# Patient Record
Sex: FEMALE | Race: WHITE
[De-identification: names, ages, dates, MRNs, and addresses within clinical notes are randomized per-mention and may not be internally consistent; named-entity substitution may affect disease eponyms.]

---

## 2018-01-28 ENCOUNTER — HOSPITAL ENCOUNTER (INPATIENT)
Dept: HOSPITAL 17 - PHED | Age: 63
LOS: 8 days | Discharge: HOME HEALTH | DRG: 193 | End: 2018-02-05
Attending: HOSPITALIST | Admitting: HOSPITALIST
Payer: MEDICAID

## 2018-01-28 VITALS
RESPIRATION RATE: 20 BRPM | OXYGEN SATURATION: 100 % | SYSTOLIC BLOOD PRESSURE: 128 MMHG | DIASTOLIC BLOOD PRESSURE: 74 MMHG | TEMPERATURE: 98.4 F | HEART RATE: 85 BPM

## 2018-01-28 VITALS — OXYGEN SATURATION: 95 %

## 2018-01-28 VITALS
OXYGEN SATURATION: 99 % | SYSTOLIC BLOOD PRESSURE: 125 MMHG | DIASTOLIC BLOOD PRESSURE: 53 MMHG | HEART RATE: 87 BPM | RESPIRATION RATE: 20 BRPM

## 2018-01-28 VITALS — WEIGHT: 258.82 LBS | BODY MASS INDEX: 43.12 KG/M2 | HEIGHT: 65 IN

## 2018-01-28 VITALS — OXYGEN SATURATION: 100 %

## 2018-01-28 VITALS
DIASTOLIC BLOOD PRESSURE: 74 MMHG | RESPIRATION RATE: 20 BRPM | HEART RATE: 85 BPM | OXYGEN SATURATION: 100 % | SYSTOLIC BLOOD PRESSURE: 128 MMHG

## 2018-01-28 VITALS
SYSTOLIC BLOOD PRESSURE: 167 MMHG | OXYGEN SATURATION: 95 % | TEMPERATURE: 98.4 F | DIASTOLIC BLOOD PRESSURE: 57 MMHG | RESPIRATION RATE: 28 BRPM | HEART RATE: 109 BPM

## 2018-01-28 VITALS
OXYGEN SATURATION: 97 % | RESPIRATION RATE: 22 BRPM | TEMPERATURE: 98 F | SYSTOLIC BLOOD PRESSURE: 153 MMHG | DIASTOLIC BLOOD PRESSURE: 70 MMHG | HEART RATE: 89 BPM

## 2018-01-28 VITALS
OXYGEN SATURATION: 100 % | RESPIRATION RATE: 28 BRPM | SYSTOLIC BLOOD PRESSURE: 119 MMHG | DIASTOLIC BLOOD PRESSURE: 50 MMHG | HEART RATE: 82 BPM

## 2018-01-28 VITALS — DIASTOLIC BLOOD PRESSURE: 64 MMHG | SYSTOLIC BLOOD PRESSURE: 131 MMHG

## 2018-01-28 VITALS — OXYGEN SATURATION: 96 %

## 2018-01-28 DIAGNOSIS — J11.00: Primary | ICD-10-CM

## 2018-01-28 DIAGNOSIS — E66.01: ICD-10-CM

## 2018-01-28 DIAGNOSIS — J96.01: ICD-10-CM

## 2018-01-28 DIAGNOSIS — J44.0: ICD-10-CM

## 2018-01-28 DIAGNOSIS — I89.0: ICD-10-CM

## 2018-01-28 DIAGNOSIS — I10: ICD-10-CM

## 2018-01-28 DIAGNOSIS — F41.0: ICD-10-CM

## 2018-01-28 DIAGNOSIS — J45.52: ICD-10-CM

## 2018-01-28 DIAGNOSIS — Z87.891: ICD-10-CM

## 2018-01-28 DIAGNOSIS — J96.02: ICD-10-CM

## 2018-01-28 DIAGNOSIS — I87.8: ICD-10-CM

## 2018-01-28 LAB
BASOPHILS # BLD AUTO: 0 TH/MM3 (ref 0–0.2)
BASOPHILS NFR BLD: 0.3 % (ref 0–2)
BUN SERPL-MCNC: 11 MG/DL (ref 7–18)
CALCIUM SERPL-MCNC: 8.3 MG/DL (ref 8.5–10.1)
CHLORIDE SERPL-SCNC: 101 MEQ/L (ref 98–107)
CREAT SERPL-MCNC: 0.77 MG/DL (ref 0.5–1)
EOSINOPHIL # BLD: 0 TH/MM3 (ref 0–0.4)
EOSINOPHIL NFR BLD: 0.4 % (ref 0–4)
ERYTHROCYTE [DISTWIDTH] IN BLOOD BY AUTOMATED COUNT: 13.4 % (ref 11.6–17.2)
GFR SERPLBLD BASED ON 1.73 SQ M-ARVRAT: 75 ML/MIN (ref 89–?)
GLUCOSE SERPL-MCNC: 138 MG/DL (ref 74–106)
HCO3 BLD-SCNC: 27.9 MEQ/L (ref 21–32)
HCT VFR BLD CALC: 38.9 % (ref 35–46)
HGB BLD-MCNC: 12.5 GM/DL (ref 11.6–15.3)
INR PPP: 1 RATIO
LYMPHOCYTES # BLD AUTO: 1.3 TH/MM3 (ref 1–4.8)
LYMPHOCYTES NFR BLD AUTO: 13.8 % (ref 9–44)
MAGNESIUM SERPL-MCNC: 2.1 MG/DL (ref 1.5–2.5)
MCH RBC QN AUTO: 28.6 PG (ref 27–34)
MCHC RBC AUTO-ENTMCNC: 32 % (ref 32–36)
MCV RBC AUTO: 89.1 FL (ref 80–100)
MONOCYTE #: 1.1 TH/MM3 (ref 0–0.9)
MONOCYTES NFR BLD: 11 % (ref 0–8)
NEUTROPHILS # BLD AUTO: 7.3 TH/MM3 (ref 1.8–7.7)
NEUTROPHILS NFR BLD AUTO: 74.5 % (ref 16–70)
PLATELET # BLD: 225 TH/MM3 (ref 150–450)
PMV BLD AUTO: 9 FL (ref 7–11)
PROTHROMBIN TIME: 10.5 SEC (ref 9.8–11.6)
RBC # BLD AUTO: 4.36 MIL/MM3 (ref 4–5.3)
SODIUM SERPL-SCNC: 137 MEQ/L (ref 136–145)
TROPONIN I SERPL-MCNC: (no result) NG/ML (ref 0.02–0.05)
WBC # BLD AUTO: 9.7 TH/MM3 (ref 4–11)

## 2018-01-28 PROCEDURE — 85025 COMPLETE CBC W/AUTO DIFF WBC: CPT

## 2018-01-28 PROCEDURE — 82948 REAGENT STRIP/BLOOD GLUCOSE: CPT

## 2018-01-28 PROCEDURE — 84100 ASSAY OF PHOSPHORUS: CPT

## 2018-01-28 PROCEDURE — 94668 MNPJ CHEST WALL SBSQ: CPT

## 2018-01-28 PROCEDURE — 83735 ASSAY OF MAGNESIUM: CPT

## 2018-01-28 PROCEDURE — 85610 PROTHROMBIN TIME: CPT

## 2018-01-28 PROCEDURE — 80053 COMPREHEN METABOLIC PANEL: CPT

## 2018-01-28 PROCEDURE — 80048 BASIC METABOLIC PNL TOTAL CA: CPT

## 2018-01-28 PROCEDURE — 71045 X-RAY EXAM CHEST 1 VIEW: CPT

## 2018-01-28 PROCEDURE — 83880 ASSAY OF NATRIURETIC PEPTIDE: CPT

## 2018-01-28 PROCEDURE — 94664 DEMO&/EVAL PT USE INHALER: CPT

## 2018-01-28 PROCEDURE — 87040 BLOOD CULTURE FOR BACTERIA: CPT

## 2018-01-28 PROCEDURE — 93005 ELECTROCARDIOGRAM TRACING: CPT

## 2018-01-28 PROCEDURE — 87804 INFLUENZA ASSAY W/OPTIC: CPT

## 2018-01-28 PROCEDURE — 94002 VENT MGMT INPAT INIT DAY: CPT

## 2018-01-28 PROCEDURE — 87449 NOS EACH ORGANISM AG IA: CPT

## 2018-01-28 PROCEDURE — 84484 ASSAY OF TROPONIN QUANT: CPT

## 2018-01-28 PROCEDURE — 82805 BLOOD GASES W/O2 SATURATION: CPT

## 2018-01-28 PROCEDURE — 94667 MNPJ CHEST WALL 1ST: CPT

## 2018-01-28 PROCEDURE — 5A09457 ASSISTANCE WITH RESPIRATORY VENTILATION, 24-96 CONSECUTIVE HOURS, CONTINUOUS POSITIVE AIRWAY PRESSURE: ICD-10-PCS | Performed by: EMERGENCY MEDICINE

## 2018-01-28 PROCEDURE — 99291 CRITICAL CARE FIRST HOUR: CPT

## 2018-01-28 PROCEDURE — 85027 COMPLETE CBC AUTOMATED: CPT

## 2018-01-28 PROCEDURE — 94003 VENT MGMT INPAT SUBQ DAY: CPT

## 2018-01-28 PROCEDURE — 94640 AIRWAY INHALATION TREATMENT: CPT

## 2018-01-28 PROCEDURE — 71275 CT ANGIOGRAPHY CHEST: CPT

## 2018-01-28 PROCEDURE — 93970 EXTREMITY STUDY: CPT

## 2018-01-28 PROCEDURE — 36600 WITHDRAWAL OF ARTERIAL BLOOD: CPT

## 2018-01-28 RX ADMIN — IPRATROPIUM BROMIDE AND ALBUTEROL SULFATE SCH AMPULE: .5; 3 SOLUTION RESPIRATORY (INHALATION) at 17:48

## 2018-01-28 RX ADMIN — IPRATROPIUM BROMIDE AND ALBUTEROL SULFATE SCH AMPULE: .5; 3 SOLUTION RESPIRATORY (INHALATION) at 17:49

## 2018-01-28 RX ADMIN — ALBUTEROL SULFATE SCH MG: 2.5 SOLUTION RESPIRATORY (INHALATION) at 19:43

## 2018-01-28 NOTE — PD
HPI


Chief Complaint:  Respiratory Symptoms


Time Seen by Provider:  17:36


Travel History


International Travel<30 days:  No


Contact w/Intl Traveler<30days:  No


Traveled to known affect area:  No





History of Present Illness


HPI


63-year-old female came to the emergency room with history of shortness of 

breath that has been extremely severe today.  Patient says that she has been 

trying to take care of for cough that she's had for past 3-4 days.  There are 

close family members were diagnosed with influenza.  Patient also has history 

of asthma and today she started getting extremely short of breath.  She called 

EMS and when they arrived they noticed that she was short of breath but got 

worse when she tried to take few steps.  They gave her 2 breathing treatments 

and IV Solu-Medrol and brought her in.  Her oxygen saturation upon their 

arrival was 89% on room air.  Patient normally does not require oxygen at home.

  She is not a smoker she said.  When patient tried to get from the ambulance 

stretcher to the ER stretcher she got extremely short of breath with air hunger.





PFSH


Past Medical History


*** Narrative Medical


List of her past medical, surgical, social and family history is reviewed from 

the nursing note.


Asthma:  Yes


Anxiety:  Yes


COPD:  Yes


Diminished Hearing:  No


Psychiatric:  Yes (PANIC DISORDER)


Seizures:  Yes


Menopausal:  Yes





Past Surgical History


Body Medical Devices:  ANXIETY


Gynecologic Surgery:  Yes (HX OF D&C)





Social History


Alcohol Use:  No


Tobacco Use:  Yes (OCCAISIONAL)


Substance Use:  No





Allergies-Medications


(Allergen,Severity, Reaction):  


Coded Allergies:  


     codeine (Unverified  Allergy, Severe, RASH, 1/28/18)


     haloperidol (Unverified  Adverse Reaction, Severe, "DYSTONIC REACTION", 1/ 28/18)


Comments


List of her allergies reviewed from the nursing note.


Reported Meds & Prescriptions





Reported Meds & Active Scripts


Active


Reported


Albuterol Neb (Albuterol Sulfate) 2.5 Mg/0.5 Ml Neb 2.5 Mg NEB ONCE


     Note: The Albuterol Sulfate Inhalation Solution is concentrated and


     must be diluted. Read complete instructions carefully before using.


Norvasc (Amlodipine Besylate) 2.5 Mg Tab Unknown Dose PO DAILY


Lopressor (Metoprolol Tartrate) 50 Mg Tab Unknown Dose PO BID





Narrative Medication


List of her home medications reviewed from the nursing note.





Review of Systems


Except as stated in HPI:  all other systems reviewed are Neg


Respiratory:  Positive: Cough, Shortness of Breath, Wheezing





Physical Exam


Narrative


GENERAL: Awake, alert, severe distress, morbidly obese, significantly anxious


SKIN: Focused skin assessment warm/dry.


HEAD: Atraumatic. Normocephalic. 


EYES: Pupils equal and round. No scleral icterus. No injection or drainage. 


ENT: No nasal bleeding or discharge.  Mucous membranes pink and moist.


NECK: Trachea midline. No JVD. 


CARDIOVASCULAR: Regular rate and rhythm.  No murmur appreciated.


RESPIRATORY: Severe respiratory distress almost air hunger.  Audible wheezes 

even without stethoscope


GASTROINTESTINAL: Abdomen soft, non-tender, nondistended. Hepatic and splenic 

margins not palpable. 


MUSCULOSKELETAL: No obvious deformities. No clubbing.  No cyanosis.  No edema. 


NEUROLOGICAL: Awake and alert. No obvious cranial nerve deficits.  Motor 

grossly within normal limits. Normal speech.


PSYCHIATRIC: Appropriate mood and affect; insight and judgment normal.





Data


Data


Last Documented VS





Vital Signs








  Date Time  Temp Pulse Resp B/P (MAP) Pulse Ox O2 Delivery O2 Flow Rate FiO2


 


1/28/18 18:59  82 28 119/50 (73) 100 BiPAP  


 


1/28/18 18:43 98.4       


 


1/28/18 18:10        50








Orders





 Orders


Complete Blood Count With Diff (1/28/18 17:36)


Basic Metabolic Panel (Bmp) (1/28/18 17:36)


B-Type Natriuretic Peptide (1/28/18 17:36)


Prothrombin Time / Inr (Pt) (1/28/18 17:36)


Troponin I (1/28/18 17:36)


Arterial Blood Gas (Abg) (1/28/18 17:36)


Influenzae A/B Antigen (1/28/18 17:36)


Blood Culture (1/28/18 17:36)


Iv Access Insert/Monitor (1/28/18 17:36)


Electrocardiogram (1/28/18 17:36)


Ecg Monitoring (1/28/18 17:36)


Oximetry (1/28/18 17:36)


Oxygen Administration (1/28/18 17:36)


Chest, Single Ap (1/28/18 17:36)


Sodium Chloride 0.9% Flush (Ns Flush) (1/28/18 17:45)


Albuterol-Ipratropium Neb (Duoneb Neb) (1/28/18 17:45)


Resp Bipap / Cpap Non Invas Vt (1/28/18 17:36)


Albuterol-Ipratropium Neb (Duoneb Neb) (1/28/18 17:39)


Albuterol Neb (Albuterol Neb) (1/28/18 19:45)


Admit Order (Ed Use Only) (1/28/18 19:50)


Magnesium (Mg) (1/28/18 17:55)


Ct Pulmonary Angiogram (1/29/18 )





Labs





Laboratory Tests








Test


  1/28/18


17:55 1/28/18


18:00


 


White Blood Count 9.7 TH/MM3  


 


Red Blood Count 4.36 MIL/MM3  


 


Hemoglobin 12.5 GM/DL  


 


Hematocrit 38.9 %  


 


Mean Corpuscular Volume 89.1 FL  


 


Mean Corpuscular Hemoglobin 28.6 PG  


 


Mean Corpuscular Hemoglobin


Concent 32.0 % 


  


 


 


Red Cell Distribution Width 13.4 %  


 


Platelet Count 225 TH/MM3  


 


Mean Platelet Volume 9.0 FL  


 


Neutrophils (%) (Auto) 74.5 %  


 


Lymphocytes (%) (Auto) 13.8 %  


 


Monocytes (%) (Auto) 11.0 %  


 


Eosinophils (%) (Auto) 0.4 %  


 


Basophils (%) (Auto) 0.3 %  


 


Neutrophils # (Auto) 7.3 TH/MM3  


 


Lymphocytes # (Auto) 1.3 TH/MM3  


 


Monocytes # (Auto) 1.1 TH/MM3  


 


Eosinophils # (Auto) 0.0 TH/MM3  


 


Basophils # (Auto) 0.0 TH/MM3  


 


CBC Comment DIFF FINAL  


 


Differential Comment   


 


Prothrombin Time 10.5 SEC  


 


Prothromb Time International


Ratio 1.0 RATIO 


  


 


 


Blood Urea Nitrogen 11 MG/DL  


 


Creatinine 0.77 MG/DL  


 


Random Glucose 138 MG/DL  


 


Calcium Level 8.3 MG/DL  


 


Magnesium Level 2.1 MG/DL  


 


Sodium Level 137 MEQ/L  


 


Potassium Level 3.5 MEQ/L  


 


Chloride Level 101 MEQ/L  


 


Carbon Dioxide Level 27.9 MEQ/L  


 


Anion Gap 8 MEQ/L  


 


Estimat Glomerular Filtration


Rate 75 ML/MIN 


  


 


 


Troponin I


  LESS THAN 0.02


NG/ML 


 


 


B-Type Natriuretic Peptide 54 PG/ML  


 


Blood Gas Puncture Site  RT RADIAL 


 


Blood Gas Patient Temperature  98.6 


 


Blood Gas HCO3  27 mmol/L 


 


Blood Gas Base Excess  1.5 mmol/L 


 


Blood Gas Oxygen Saturation  98 % 


 


Arterial Blood pH  7.34 


 


Arterial Blood Partial


Pressure CO2 


  52 mmHG 


 


 


Arterial Blood Partial


Pressure O2 


  451 mmHG 


 


 


Arterial Blood Oxygen Content  17.4 Vol % 


 


Arterial Blood


Carboxyhemoglobin 


  1.0 % 


 


 


Arterial Blood Methemoglobin  1.1 % 


 


Blood Gas Hemoglobin  11.8 G/DL 


 


Oxygen Delivery Device  BIPAP 


 


Blood Gas Ventilator Setting


  


  IPAP 14/ EPAP


7


 


Blood Gas Inspired Oxygen  100 % 











MDM


Medical Decision Making


Medical Screen Exam Complete:  Yes


Emergency Medical Condition:  Yes


Medical Record Reviewed:  Yes


Interpretation(s)


Twelve-lead EKG was reviewed by me.  Normal sinus rhythm, normal axis, 

nonspecific ST-T wave changes.  Heart rate of 82 bpm


Differential Diagnosis


Status asthmaticus, CHF, pneumonia, possible PE


Narrative Course


7:44 PM patient was immediately put on BiPAP since she appeared to be in 

significant distress being on the nebulizer.  She was given 3 duo nebs through 

the BiPAP.  This seemed to help her significantly.  A blood gas was obtained 

after patient was on BiPAP which showed good oxygenation and pH.  Chest x-ray 

showed a possible lung mass in the left apex.  A CT was recommended by the 

radiologist.  At this point I decided to do a CT pulmonary angiogram since 

patient had good renal function and this was allowed us to rule out PE as well 

as the definition on the lung mass.  However I was told that when patient was 

tried to be moved from the EMS stretcher to the CT table she again became 

extremely short of breath.  There was no BiPAP on her at this point because of 

the CT and the tests could not be done and patient was brought back.  She has 

been put on BiPAP again.  I've ordered 2 more doses of albuterol nebulizer.  At 

this point I'm concerned that patient has a potential for respiratory failure 

given her extremely brittle respiratory state.  Also her body habitus is less 

than ideal for crash intubation and hence she should be a better candidate for 

the ICU at the [facility].  I discussed the case with Dr. Grover from ICU who 

agreed.  She has accepted the patient.  Patient will be sent emergently over to 

the [facility] hospital unit.  Her influenza was negative.


Critical Care Narrative


Aggregate critical care time was 60 minutes. Time to perform other separately 

billable procedures was not  


included in the critical care time. My time did not include minutes spent 

treating any other patients simultaneously or on  


activities that did not directly contribute to the patient's treatment.  





The services I provided to this patient were to treat and/or prevent clinically 

significant deterioration that could result  


in: Severe respiratory distress, status asthmaticus, BiPAP





I provided critical care services requiring my management, as noted below:


Chart data review, documentation time, medication orders and management, vital 

sign assessments/reviewing monitor data,  


ordering and reviewing lab tests, ordering and interpreting/reviewing x-rays 

and diagnostic studies, care of the patient  


and discussion of the patient with the admitting physicians.





Procedures


EKG Prior to Arrival:  No





Physician Communication


Physician Communication


Dr. Grover





Diagnosis





 Primary Impression:  


 Respiratory distress


 Additional Impressions:  


 Status asthmaticus


 Qualified Codes:  J45.52 - Severe persistent asthma with status asthmaticus


 Lung mass


 Morbidly obese





Admitting Information


Admitting Physician Requests:  Admit











Michele Bobby MD Jan 28, 2018 17:39

## 2018-01-28 NOTE — RADRPT
EXAM DATE/TIME:  01/28/2018 17:50 

 

[facility] COMPARISON:     

No previous studies available for comparison.

 

                     

INDICATIONS :     

Short of breath

                     

 

MEDICAL HISTORY :     

None.          

 

SURGICAL HISTORY :     

None.   

 

ENCOUNTER:     

Initial                                        

 

ACUITY:     

3 days      

 

PAIN SCORE:     

0/10

 

LOCATION:     

Bilateral chest 

 

FINDINGS:     

Focal infiltrate versus mass left suprahilar region. There is trace atelectasis at both bases. No ple
ural effusion. No pneumothorax.

 

Heart size within normal limits.

 

CONCLUSION:     

1. Potentially a left suprahilar mass. Noncontrast chest CT recommended.

2. Minimal bibasilar atelectasis.

 

 

 

 Devan Gee MD on January 28, 2018 at 17:58           

Board Certified Radiologist.

 This report was verified electronically.

## 2018-01-29 VITALS
DIASTOLIC BLOOD PRESSURE: 65 MMHG | HEART RATE: 81 BPM | RESPIRATION RATE: 22 BRPM | SYSTOLIC BLOOD PRESSURE: 165 MMHG | OXYGEN SATURATION: 99 % | TEMPERATURE: 98 F

## 2018-01-29 VITALS — OXYGEN SATURATION: 99 %

## 2018-01-29 VITALS
SYSTOLIC BLOOD PRESSURE: 166 MMHG | OXYGEN SATURATION: 97 % | RESPIRATION RATE: 22 BRPM | DIASTOLIC BLOOD PRESSURE: 73 MMHG | HEART RATE: 79 BPM

## 2018-01-29 VITALS
RESPIRATION RATE: 24 BRPM | SYSTOLIC BLOOD PRESSURE: 175 MMHG | HEART RATE: 87 BPM | DIASTOLIC BLOOD PRESSURE: 77 MMHG | OXYGEN SATURATION: 97 %

## 2018-01-29 VITALS
HEART RATE: 83 BPM | DIASTOLIC BLOOD PRESSURE: 76 MMHG | RESPIRATION RATE: 39 BRPM | SYSTOLIC BLOOD PRESSURE: 170 MMHG | OXYGEN SATURATION: 97 %

## 2018-01-29 VITALS
OXYGEN SATURATION: 97 % | SYSTOLIC BLOOD PRESSURE: 158 MMHG | RESPIRATION RATE: 23 BRPM | HEART RATE: 88 BPM | TEMPERATURE: 98.8 F | DIASTOLIC BLOOD PRESSURE: 67 MMHG

## 2018-01-29 VITALS — OXYGEN SATURATION: 96 %

## 2018-01-29 VITALS
DIASTOLIC BLOOD PRESSURE: 72 MMHG | SYSTOLIC BLOOD PRESSURE: 169 MMHG | RESPIRATION RATE: 32 BRPM | OXYGEN SATURATION: 98 % | HEART RATE: 80 BPM

## 2018-01-29 VITALS
OXYGEN SATURATION: 98 % | SYSTOLIC BLOOD PRESSURE: 167 MMHG | TEMPERATURE: 97.2 F | DIASTOLIC BLOOD PRESSURE: 72 MMHG | HEART RATE: 87 BPM | RESPIRATION RATE: 32 BRPM

## 2018-01-29 VITALS
OXYGEN SATURATION: 96 % | SYSTOLIC BLOOD PRESSURE: 143 MMHG | HEART RATE: 71 BPM | RESPIRATION RATE: 23 BRPM | DIASTOLIC BLOOD PRESSURE: 65 MMHG

## 2018-01-29 VITALS
RESPIRATION RATE: 33 BRPM | SYSTOLIC BLOOD PRESSURE: 182 MMHG | TEMPERATURE: 98 F | HEART RATE: 80 BPM | OXYGEN SATURATION: 98 % | DIASTOLIC BLOOD PRESSURE: 81 MMHG

## 2018-01-29 VITALS
RESPIRATION RATE: 43 BRPM | HEART RATE: 99 BPM | SYSTOLIC BLOOD PRESSURE: 141 MMHG | OXYGEN SATURATION: 96 % | DIASTOLIC BLOOD PRESSURE: 63 MMHG | TEMPERATURE: 97.9 F

## 2018-01-29 VITALS — HEART RATE: 91 BPM

## 2018-01-29 VITALS
DIASTOLIC BLOOD PRESSURE: 68 MMHG | SYSTOLIC BLOOD PRESSURE: 151 MMHG | HEART RATE: 86 BPM | OXYGEN SATURATION: 98 % | RESPIRATION RATE: 37 BRPM

## 2018-01-29 VITALS
DIASTOLIC BLOOD PRESSURE: 62 MMHG | HEART RATE: 79 BPM | TEMPERATURE: 97.8 F | OXYGEN SATURATION: 100 % | RESPIRATION RATE: 17 BRPM | SYSTOLIC BLOOD PRESSURE: 144 MMHG

## 2018-01-29 VITALS — HEART RATE: 84 BPM

## 2018-01-29 VITALS — OXYGEN SATURATION: 100 %

## 2018-01-29 VITALS — OXYGEN SATURATION: 93 %

## 2018-01-29 LAB
PHOSPHATE SERPL-MCNC: 3.2 MG/DL (ref 2.5–4.9)
TROPONIN I SERPL-MCNC: (no result) NG/ML (ref 0.02–0.05)

## 2018-01-29 RX ADMIN — FAMOTIDINE SCH MG: 20 TABLET, FILM COATED ORAL at 21:01

## 2018-01-29 RX ADMIN — Medication SCH ML: at 21:01

## 2018-01-29 RX ADMIN — ENOXAPARIN SODIUM SCH MG: 40 INJECTION SUBCUTANEOUS at 02:35

## 2018-01-29 RX ADMIN — IPRATROPIUM BROMIDE AND ALBUTEROL SULFATE SCH AMPULE: .5; 3 SOLUTION RESPIRATORY (INHALATION) at 16:30

## 2018-01-29 RX ADMIN — IPRATROPIUM BROMIDE AND ALBUTEROL SULFATE SCH AMPULE: .5; 3 SOLUTION RESPIRATORY (INHALATION) at 11:18

## 2018-01-29 RX ADMIN — Medication SCH ML: at 07:16

## 2018-01-29 RX ADMIN — OSELTAMIVIR PHOSPHATE SCH MG: 75 CAPSULE ORAL at 11:45

## 2018-01-29 RX ADMIN — LABETALOL HYDROCHLORIDE PRN MG: 5 INJECTION, SOLUTION INTRAVENOUS at 07:15

## 2018-01-29 RX ADMIN — ACETAMINOPHEN PRN MG: 325 TABLET ORAL at 08:16

## 2018-01-29 RX ADMIN — METOPROLOL TARTRATE SCH MG: 50 TABLET, FILM COATED ORAL at 08:16

## 2018-01-29 RX ADMIN — OSELTAMIVIR PHOSPHATE SCH MG: 75 CAPSULE ORAL at 21:01

## 2018-01-29 RX ADMIN — IPRATROPIUM BROMIDE AND ALBUTEROL SULFATE SCH AMPULE: .5; 3 SOLUTION RESPIRATORY (INHALATION) at 20:40

## 2018-01-29 RX ADMIN — FAMOTIDINE SCH MG: 20 TABLET, FILM COATED ORAL at 08:17

## 2018-01-29 RX ADMIN — METOPROLOL TARTRATE SCH MG: 50 TABLET, FILM COATED ORAL at 21:01

## 2018-01-29 RX ADMIN — INSULIN ASPART SCH: 100 INJECTION, SOLUTION INTRAVENOUS; SUBCUTANEOUS at 16:25

## 2018-01-29 RX ADMIN — CHLORHEXIDINE GLUCONATE SCH PACK: 500 CLOTH TOPICAL at 04:00

## 2018-01-29 RX ADMIN — INSULIN ASPART SCH: 100 INJECTION, SOLUTION INTRAVENOUS; SUBCUTANEOUS at 11:47

## 2018-01-29 RX ADMIN — INSULIN ASPART SCH: 100 INJECTION, SOLUTION INTRAVENOUS; SUBCUTANEOUS at 08:00

## 2018-01-29 RX ADMIN — LABETALOL HYDROCHLORIDE PRN MG: 5 INJECTION, SOLUTION INTRAVENOUS at 12:05

## 2018-01-29 RX ADMIN — LEVOFLOXACIN SCH MLS/HR: 5 INJECTION, SOLUTION INTRAVENOUS at 16:26

## 2018-01-29 RX ADMIN — IPRATROPIUM BROMIDE AND ALBUTEROL SULFATE SCH AMPULE: .5; 3 SOLUTION RESPIRATORY (INHALATION) at 03:20

## 2018-01-29 RX ADMIN — STANDARDIZED SENNA CONCENTRATE AND DOCUSATE SODIUM SCH TAB: 8.6; 5 TABLET, FILM COATED ORAL at 08:17

## 2018-01-29 RX ADMIN — INSULIN ASPART SCH: 100 INJECTION, SOLUTION INTRAVENOUS; SUBCUTANEOUS at 21:00

## 2018-01-29 RX ADMIN — STANDARDIZED SENNA CONCENTRATE AND DOCUSATE SODIUM SCH TAB: 8.6; 5 TABLET, FILM COATED ORAL at 21:01

## 2018-01-29 RX ADMIN — IPRATROPIUM BROMIDE AND ALBUTEROL SULFATE SCH AMPULE: .5; 3 SOLUTION RESPIRATORY (INHALATION) at 08:06

## 2018-01-29 RX ADMIN — IPRATROPIUM BROMIDE AND ALBUTEROL SULFATE SCH AMPULE: .5; 3 SOLUTION RESPIRATORY (INHALATION) at 23:55

## 2018-01-29 NOTE — HHI.HP
Viral Upper Respiratory Illness (Child)  Your child has a viral upper respiratory illness (URI), which is another term for the common cold. The virus is contagious during the first few days. It is spread through the air by coughing, sneezing, or by direct contact (touching your sick child then touching your own eyes, nose, or mouth). Frequent handwashing will decrease risk of spread. Most viral illnesses resolve within 7 to 14 days with rest and simple home remedies. However, they may sometimes last up to 4 weeks. Antibiotics will not kill a virus and are generally not prescribed for this condition.    Home care  · Fluids: Fever increases water loss from the body. Encourage your child to drink lots of fluids to loosen lung secretions and make it easier to breathe. For infants under 1 year old, continue regular formula or breast feedings. Between feedings, give oral rehydration solution. This is available from drugstores and grocery stores without a prescription. For children over 1 year old, give plenty of fluids, such as water, juice, gelatin water, soda without caffeine, ginger ale, lemonade, or ice pops.  · Eating: If your child doesn't want to eat solid foods, it's OK for a few days, as long as he or she drinks lots of fluid.  · Rest: Keep children with fever at home resting or playing quietly until the fever is gone. Encourage frequent naps. Your child may return to day care or school when the fever is gone and he or she is eating well and feeling better.  · Sleep: Periods of sleeplessness and irritability are common. A congested child will sleep best with the head and upper body propped up on pillows or with the head of the bed frame raised on a 6-inch block. An infant may sleep in a car seat placed in the crib or in a baby swing. If you use a car seat or baby swing, always make certain the baby is safely fastened in the device.  · Cough: Coughing is a normal part of this illness. A cool mist humidifier at  HPI


Service


Critical Care Medicine


Primary Care Physician


No Primary Care Physician


Admission Diagnosis





respiratory distress, status asthmaticus, lung mass, morbid obesity


Diagnosis:  


Travel History


International Travel<30 Days:  No


Contact w/Intl Traveler <30 Da:  No


Traveled to Known Affected Are:  No


History of Present Illness


63-year-old  female with past medical history of asthma, hypertension, 

obesity, lymphedema  who presented to [facility] emergency 

department [place] with a three-day history of shortness of breath. She had 

wheezing and increased work of breathing upon [facility] arrival and was given 

nebulizer treatments and Solu-Medrol en route.  She was started on BiPAP . 

  She was given Duoneb x3 in the ED and appeared improved so Bipap mask was 

removed and she was taken for CTPA but she became more SOB and the imaging 

could not be completed. She was placed back on Bipap, given albuterol nebs x2 

and transferred to intensivist at [facility].  She states symptoms began 3 days ago 

with cough, rhinorrhea, subjective fever, sore throat, fatigue, and myalgias. 

She believed she had the flu and stated she had been exposed to others with 

similar symptoms.  She had progressive wheezing and shortness of breath and 

said that this is very similar to a prior asthma exacerbation that was 

triggered by URI and resulted in hospitalization on BiPAP in .  Says she 

has had asthma since age 2 and has never been intubated in the past. She denies 

chest pain or hemoptysis. She has chronic lower extremity lymphedema and venous 

stasis changes.





Past Family Social History


Allergies:  


Coded Allergies:  


     codeine (Unverified  Allergy, Severe, RASH, 18)


     haloperidol (Unverified  Adverse Reaction, Severe, "DYSTONIC REACTION", )


Past Medical History


Asthma


Hypertension


Anxiety


Lower extremity lymphedema


Past Surgical History


D and C


Reported Medications


Albuterol


Metoprolol 50 mg by mouth twice a day


Norvasc 10 mg daily (per discussion with patient)


Family History


Father had hypertension


Monitor  age 89 with breast cancer and colon cancer


Social History


Quit smoking in the 


No alcohol or illicit drug use





Physical Exam


Vital Signs





Vital Signs








  Date Time  Temp Pulse Resp B/P (MAP) Pulse Ox O2 Delivery O2 Flow Rate FiO2


 


18 01:40     100   50


 


18 00:00 97.8 79 17 144/62 (89) 100   


 


18 22:15 98.0 89 22 153/70 (97) 97   


 


18 22:05     95   40


 


18 21:15  87 20 131/64 (86) 94 Non-Rebreather  


 


18 20:00  87 20 125/53 (77) 99 BiPAP  


 


18 18:59  82 28 119/50 (73) 100 BiPAP  


 


18 18:59  82 28  100 BiPAP  


 


18 18:43 98.4 85 20 128/74 (92) 100 BiPAP  


 


18 18:37  85 20 128/74 (92) 100 Ventilator  


 


18 18:10     100   50


 


18 17:53     100   100


 


18 17:43      BiPAP  


 


18 17:43     96   


 


18 17:37 98.4 109 28 167/57 (93) 95   








Physical Exam


GENERAL: Well-nourished, well-developed obese  female who is sitting 

up in [facility] bed on BiPAP.


SKIN: Warm and dry.


HEAD: Atraumatic. Normocephalic. 


EYES: Pupils equal and round. No scleral icterus. No injection or drainage. 


ENT: BiPAP mask in place.


CV: Regular rate and rhythm rhythm. No murmurs rubs or gallops. 


RESPIRATORY: On BiPAP 14/8 with FiO2 50% and respiratory rate in low 20s. 

Bilateral wheezing. No rales. 


GASTROINTESTINAL: Abdomen soft, non-tender, nondistended. 


MUSCULOSKELETAL: Extremities without clubbing, cyanosis, bilateral edema with 

venous stasis changes overlyingdistal tibia bilaterally


NEUROLOGICAL: Awake and alert, oriented. Motor grossly within normal limits.


Laboratory





Laboratory Tests








Test


  18


17:55 18


18:00 18


01:17


 


White Blood Count 9.7   


 


Red Blood Count 4.36   


 


Hemoglobin 12.5   


 


Hematocrit 38.9   


 


Mean Corpuscular Volume 89.1   


 


Mean Corpuscular Hemoglobin 28.6   


 


Mean Corpuscular Hemoglobin


Concent 32.0 


  


  


 


 


Red Cell Distribution Width 13.4   


 


Platelet Count 225   


 


Mean Platelet Volume 9.0   


 


Neutrophils (%) (Auto) 74.5   


 


Lymphocytes (%) (Auto) 13.8   


 


Monocytes (%) (Auto) 11.0   


 


Eosinophils (%) (Auto) 0.4   


 


Basophils (%) (Auto) 0.3   


 


Neutrophils # (Auto) 7.3   


 


Lymphocytes # (Auto) 1.3   


 


Monocytes # (Auto) 1.1   


 


Eosinophils # (Auto) 0.0   


 


Basophils # (Auto) 0.0   


 


CBC Comment DIFF FINAL   


 


Differential Comment    


 


Prothrombin Time 10.5   


 


Prothromb Time International


Ratio 1.0 


  


  


 


 


Blood Urea Nitrogen 11   


 


Creatinine 0.77   


 


Random Glucose 138   


 


Calcium Level 8.3   


 


Magnesium Level 2.1   


 


Sodium Level 137   


 


Potassium Level 3.5   


 


Chloride Level 101   


 


Carbon Dioxide Level 27.9   


 


Anion Gap 8   


 


Estimat Glomerular Filtration


Rate 75 


  


  


 


 


Troponin I LESS THAN 0.02   LESS THAN 0.02 


 


B-Type Natriuretic Peptide 54   


 


Blood Gas Puncture Site  RT RADIAL  


 


Blood Gas Patient Temperature  98.6  


 


Blood Gas HCO3  27  


 


Blood Gas Base Excess  1.5  


 


Blood Gas Oxygen Saturation  98  


 


Arterial Blood pH  7.34  


 


Arterial Blood Partial


Pressure CO2 


  52 


  


 


 


Arterial Blood Partial


Pressure O2 


  451 


  


 


 


Arterial Blood Oxygen Content  17.4  


 


Arterial Blood


Carboxyhemoglobin 


  1.0 


  


 


 


Arterial Blood Methemoglobin  1.1  


 


Blood Gas Hemoglobin  11.8  


 


Oxygen Delivery Device  BIPAP  


 


Blood Gas Ventilator Setting


  


  IPAP 14/ EPAP


7 


 


 


Blood Gas Inspired Oxygen  100  














 Date/Time


Source Procedure


Growth Status


 


 


 18 17:55


Blood Peripheral Aerobic Blood Culture


Pending Received


 


 18 17:55


Blood Peripheral Anaerobic Blood Culture


Pending Received


 


 18 18:15


Nasal Aspirate Influenza Types A,B Antigen (NICK) - Final


NEGATIVE FOR FLU A AND B ANTIGEN.... Complete








Result Diagram:  


18 175








Caprini VTE Risk Assessment


Caprini Risk Assessment Model











 Point Value = 1          Point Value = 2  Point Value = 3  Point Value = 5


 


Age 41-60


Minor surgery


BMI > 25 kg/m2


Swollen legs


Varicose veins


Pregnancy or postpartum


History of unexplained or recurrent


   spontaneous 


Oral contraceptives or hormone


   replacement


Sepsis (< 1 month)


Serious lung disease, including


   pneumonia (< 1 month)


Abnormal pulmonary function


Acute myocardial infarction


Congestive heart failure (< 1 month)


History of inflammatory bowel disease


Medical patient at bed rest Age 61-74


Arthroscopic surgery


Major open surgery (> 45 min)


Laparoscopic surgery (> 45 min)


Malignancy


Confined to bed (> 72 hours)


Immobilizing plaster cast


Central venous access Age >= 75


History of VTE


Family history of VTE


Factor V Leiden


Prothrombin 75767N


Lupus anticoagulant


Anticardiolipin antibodies


Elevated serum homocysteine


Heparin-induced thrombocytopenia


Other congenital or acquired


   thrombophilia Stroke (< 1 month)


Elective arthroplasty


Hip, pelvis, or leg fracture


Acute spinal cord injury (< 1 month)








Prophylaxis Regimen











   Total Risk


Factor Score Risk Level Prophylaxis Regimen


 


0-1      Low Early ambulation


 


2 Moderate Order ONE of the following:


*Sequential Compression Device (SCD)


*Heparin 5000 units SQ BID


 


3-4 Higher Order ONE of the following medications:


*Heparin 5000 units SQ TID


*Enoxaparin/Lovenox 40 mg SQ daily (WT < 150 kg, CrCl > 30 mL/min)


*Enoxaparin/Lovenox 30 mg SQ daily (WT < 150 kg, CrCl > 10-29 mL/min)


*Enoxaparin/Lovenox 30 mg SQ BID (WT < 150 kg, CrCl > 30 mL/min)


AND/OR


*Sequential Compression Device (SCD)


 


5 or more Highest Order ONE of the following medications:


*Heparin 5000 units SQ TID (Preferred with Epidurals)


*Enoxaparin/Lovenox 40 mg SQ daily (WT < 150 kg, CrCl > 30 mL/min)


*Enoxaparin/Lovenox 30 mg SQ daily (WT < 150 kg, CrCl > 10-29 mL/min)


*Enoxaparin/Lovenox 30 mg SQ BID (WT < 150 kg, CrCl > 30 mL/min)


AND


*Sequential Compression Device (SCD)











Assessment and Plan


Assessment and Plan


NEURO:


Tylenol as needed for pain or fever





RESP:


Acute hypercapnic respiratory failure on BiPAP


Acute asthma exacerbation


Remote history of tobacco abuse


Received DuoNeb 3, albuterol 2 in the emergency department.  Continue DuoNeb 

every 4 hours.  Albuterol every 2 hours as needed.  


Solu-Medrol 60 mg IV every 6 hours and transition to prednisone when she 

improves and is better able to take po. 


CXR with ?KAYCEE mass. Followup CT PA Chest 





CV:


Hypertension


Continue home dose of metoprolol 50  mg by mouth twice a day


Continue home dose of Norvasc 10 mg by mouth daily


BMP is normal.  Trend troponins which are negative.





GI: 


Obesity


Heart healthy diet





FEN/RENAL:


Place Periwick


Monitor electrolytes and replace as indicated per replacement protocol





ID:


Viral URI /pneumonia


Influenza screen negative.  We'll send PCR.


Patient has symptoms highly suggestive of influenza during current season of 

high level flu activity. She is high risk due to asthma and age 63 so will 

initiate tamiflu. 


CXR wtih KAYCEE mass/?infiltrate. Levaquin for coverage of acquired organisms/

atypicals, can be discontinued if bacterial infection felt to be less likely 

based on CT findings/cultures. 


Blood cultures pending. Legionella and pneumococcal antigen. 





HEME:


Bilateral lymphedema


Bilateral lower extremity ultrasound negative for DVT





ENDO: Monitor bedside glucose and initiate low-dose insulin sliding scale as 

indicated while on stress dose steroids.





PROPH: SCDs and Lovenox 40 mg subcutaneous daily for DVT prophylaxis.  

Famotidine for stress ulcer prophylaxis





ACCESS: Peripheral IV





Full code





Level 2 H and P











Marlen Grover MD 2018 03:55 the bedside may be helpful. Be sure to clean the humidifier every day to prevent mold. Over-the-counter cough and cold medicines have not proved to be any more helpful than a placebo (syrup with no medicine in it). In addition, these medicines can produce serious side effects, especially in infants under 2 years of age. Do not give over-the-counter cough and cold medicines to children under 6 years unless your healthcare provider has specifically advised you to do so. Also, don’t expose your child to cigarette smoke. It can make the cough worse.  · Nasal congestion: Suction the nose of infants with a bulb syringe. You may put 2 to 3 drops of saltwater (saline) nose drops in each nostril before suctioning. This helps thin and remove secretions. Saline nose drops are available without a prescription. You can also use ¼ teaspoon of table salt dissolved in 1 cup of water.  · Fever: Use children’s acetaminophen for fever, fussiness, or discomfort, unless another medicine was prescribed. In infants over 6 months of age, you may use children’s ibuprofen or acetaminophen. (Note: If your child has chronic liver or kidney disease or has ever had a stomach ulcer or gastrointestinal bleeding, talk with your healthcare provider before using these medicines.) Aspirin should never be given to anyone younger than 18 years of age who is ill with a viral infection or fever. It may cause severe liver or brain damage.  · Preventing spread: Washing your hands before and after touching your sick child will help prevent a new infection. It will also help prevent the spread of this viral illness to yourself and other children.  Follow-up care  Follow up with your healthcare provider, or as advised.  When to seek medical advice  For a usually healthy child, call your child's healthcare provider right away if any of these occur:  · A fever, as follows:  ¨ Your child is 3 months old or younger and has a fever of 100.4°F (38°C) or higher. Get  medical care right away. Fever in a young baby can be a sign of a dangerous infection.  ¨ Your child is of any age and has repeated fevers above 104°F (40°C).  ¨ Your child is younger than 2 years of age and a fever of 100.4°F (38°C) continues for more than 1 day.  ¨ Your child is 2 years old or older and a fever of 100.4°F (38°C) continues for more than 3 days.  · Earache, sinus pain, stiff or painful neck, headache, repeated diarrhea, or vomiting.  · Unusual fussiness.  · A new rash appears.  · Your child is dehydrated, with one or more of these symptoms:  ¨ No tears when crying.  ¨ “Sunken” eyes or a dry mouth.  ¨ No wet diapers for 8 hours in infants.  ¨ Reduced urine output in older children.  Call 911, or get immediate medical care  Contact emergency services if any of these occur:  · Increased wheezing or difficulty breathing  · Unusual drowsiness or confusion  · Fast breathing, as follows:  ¨ Birth to 6 weeks: over 60 breaths per minute.  ¨ 6 weeks to 2 years: over 45 breaths per minute.  ¨ 3 to 6 years: over 35 breaths per minute.  ¨ 7 to 10 years: over 30 breaths per minute.  ¨ Older than 10 years: over 25 breaths per minute.  © 6771-8179 [employer]. [address] [place] [address], [place] [address]. All rights reserved. This information is not intended as a substitute for professional medical care. Always follow your healthcare professional's instructions.    Continue to take 24 hour allergy medication and Flonase. Add a decongestant such as Mucinex that works on nasal congestion. May use inhaler as needed for wheezing or shortness of breath. If symptoms do not improve in 3-5 days, follow up with PCP.

## 2018-01-29 NOTE — RADRPT
EXAM DATE/TIME:  01/29/2018 08:31 

 

[facility] COMPARISON:     

No previous studies available for comparison.

        

 

 

INDICATIONS :                

Shortness of breath.

            

 

MEDICAL HISTORY :     

Hypertension. Chronic obstructive pulmonary disease.  

 

SURGICAL HISTORY :      

D&C.

 

ENCOUNTER:     

Subsequent

 

ACUITY:     

1 day

 

PAIN SCORE:      

7/10

 

LOCATION:      

Bilateral  leg.

                       

 

TECHNIQUE:     

Venous ultrasound of the left and right leg was performed from the inguinal ligament to the proximal 
calf.  Real-time, color Doppler and spectral tracing, compression and augmentation techniques were us
ed.  

 

FINDINGS:     

 

RIGHT LEG:     

There is normal compressibility of the deep venous system from the inguinal region to the proximal ca
lf.  No echogenic clot is seen in the lumen of the common femoral, femoral, popliteal, and posterior 
tibial veins.  There is a normal response of the venous system to proximal and distal augmentation an
d respiration.  

 

LEFT LEG:     

There is normal compressibility of the deep venous system from the inguinal region to the proximal ca
lf.  No echogenic clot is seen in the lumen of the common femoral, femoral, popliteal, and posterior 
tibial veins.  There is a normal response of the venous system to proximal and distal augmentation an
d respiration.  

 

CONCLUSION:     Negative exam with no evidence of deep venous thrombosis. 

 

 

 Hany Redman MD on January 29, 2018 at 10:12           

Board Certified Radiologist.

 This report was verified electronically.

## 2018-01-29 NOTE — EKG
Date Performed: 01/28/2018       Time Performed: 18:18:19

 

PTAGE:      63 years

 

EKG:      Sinus rhythm 

 

 WITH MARKED SINUS ARRHYTHMIA BORDERLINE ECG 

 

NO PREVIOUS TRACING            

 

DOCTOR:   Parviz Marino  Interpretating Date/Time  01/29/2018 18:57:05

## 2018-01-29 NOTE — RADRPT
EXAM DATE/TIME:  01/29/2018 21:28 

 

[facility] COMPARISON:     

No previous studies available for comparison.

 

 

INDICATIONS :     

Respiratory distress.

                      

 

IV CONTRAST:     

75 cc Omnipaque 350 (iohexol)  

 

 

RADIATION DOSE:     

10.89 CTDIvol (mGy) 

 

 

MEDICAL HISTORY :     

Chronic obstructive pulmonary disease. Seizures. Hypertension.

 

SURGICAL HISTORY :      

None. 

 

ENCOUNTER:      

Initial

 

ACUITY:      

1 day

 

PAIN SCALE:      

0/10

 

LOCATION:       

Bilateral chest 

 

TECHNIQUE:     

Volumetric scanning of the chest was performed using a pulmonary embolism protocol MIP images were re
constructed.  Using automated exposure control and adjustment of the mA and/or kV according to patien
t size, radiation dose was kept as low as reasonably achievable to obtain optimal diagnostic quality 
images.   DICOM format image data is available electronically for review and comparison.  

 

Follow-up recommendations for detected pulmonary nodules are based at a minimum on nodule size and pa
tient risk factors according to Fleischner Society Guidelines.

 

FINDINGS:     

 

PULMONARY ARTERIES:

No filling defects are seen in the pulmonary arteries through the segmental level.

 

LUNGS:     

There is no pneumothorax .  Patchy opacity is noted within the right middle lobe consistent with prob
able focal pneumonia. No concerning pulmonary nodule is visualized.

 

PLEURAE:     

There is no pleural thickening or pleural effusion.

 

MEDIASTINUM:     

There is good visualization of the great vessels of the middle mediastinum.  No evidence of mediastin
al or hilar adenopathy/mass. Coronary artery calcifications are noted.

 

MUSCULOSKELETAL:     

Within normal limits for patient age.

 

MISCELLANEOUS:     

There is an 8 mm low-density lesion within the left lobe of the liver near the dome which is indeterm
inate.

 

CONCLUSION:     

1. No evidence of pulmonary embolism. 

2. Patchy opacity within the right middle lobe consistent with probable focal pneumonia. Clinical cor
relation is recommended.

3. Coronary artery calcifications. 

4. 8 mm low density lesion within the left lobe of the liver near the dome which is indeterminate. 

5. Degenerative changes and scoliosis of the thoraco-lumbar spine are noted.

 

 

 

 

 Bradley Chamberlain MD on January 29, 2018 at 21:51           

Board Certified Radiologist.

 This report was verified electronically.

## 2018-01-29 NOTE — EKG
Date Performed: 01/29/2018       Time Performed: 06:39:38

 

PTAGE:      63 years

 

EKG:      Sinus rhythm 

 

 ST junctional depression is nonspecific Since previous tracing, no significant change noted [place]
ne ECG

 

PREVIOUS TRACING       : 01/28/2018 18.18

 

DOCTOR:   Parviz Marino  Interpretating Date/Time  01/29/2018 18:57:15

## 2018-01-30 VITALS — OXYGEN SATURATION: 99 %

## 2018-01-30 VITALS
HEART RATE: 76 BPM | DIASTOLIC BLOOD PRESSURE: 65 MMHG | RESPIRATION RATE: 15 BRPM | TEMPERATURE: 98.3 F | SYSTOLIC BLOOD PRESSURE: 139 MMHG | OXYGEN SATURATION: 96 %

## 2018-01-30 VITALS
TEMPERATURE: 98 F | SYSTOLIC BLOOD PRESSURE: 133 MMHG | RESPIRATION RATE: 14 BRPM | HEART RATE: 61 BPM | OXYGEN SATURATION: 100 % | DIASTOLIC BLOOD PRESSURE: 56 MMHG

## 2018-01-30 VITALS
TEMPERATURE: 98 F | RESPIRATION RATE: 34 BRPM | HEART RATE: 90 BPM | OXYGEN SATURATION: 99 % | DIASTOLIC BLOOD PRESSURE: 63 MMHG | SYSTOLIC BLOOD PRESSURE: 138 MMHG

## 2018-01-30 VITALS
RESPIRATION RATE: 17 BRPM | TEMPERATURE: 98.6 F | SYSTOLIC BLOOD PRESSURE: 131 MMHG | DIASTOLIC BLOOD PRESSURE: 57 MMHG | HEART RATE: 65 BPM | OXYGEN SATURATION: 95 %

## 2018-01-30 VITALS
TEMPERATURE: 98.4 F | DIASTOLIC BLOOD PRESSURE: 72 MMHG | SYSTOLIC BLOOD PRESSURE: 161 MMHG | RESPIRATION RATE: 26 BRPM | HEART RATE: 85 BPM | OXYGEN SATURATION: 97 %

## 2018-01-30 VITALS — OXYGEN SATURATION: 96 %

## 2018-01-30 VITALS — HEART RATE: 69 BPM

## 2018-01-30 VITALS — HEART RATE: 62 BPM

## 2018-01-30 VITALS
OXYGEN SATURATION: 98 % | SYSTOLIC BLOOD PRESSURE: 149 MMHG | TEMPERATURE: 98.1 F | DIASTOLIC BLOOD PRESSURE: 65 MMHG | HEART RATE: 98 BPM | RESPIRATION RATE: 24 BRPM

## 2018-01-30 VITALS — HEART RATE: 74 BPM

## 2018-01-30 VITALS — HEART RATE: 100 BPM

## 2018-01-30 VITALS — OXYGEN SATURATION: 98 %

## 2018-01-30 VITALS — HEART RATE: 93 BPM

## 2018-01-30 VITALS — HEART RATE: 83 BPM

## 2018-01-30 LAB
ALBUMIN SERPL-MCNC: 3.4 GM/DL (ref 3.4–5)
ALP SERPL-CCNC: 80 U/L (ref 45–117)
ALT SERPL-CCNC: 41 U/L (ref 10–53)
AST SERPL-CCNC: 103 U/L (ref 15–37)
BASOPHILS # BLD AUTO: 0 TH/MM3 (ref 0–0.2)
BASOPHILS NFR BLD: 0 % (ref 0–2)
BILIRUB SERPL-MCNC: 0.4 MG/DL (ref 0.2–1)
BUN SERPL-MCNC: 16 MG/DL (ref 7–18)
CALCIUM SERPL-MCNC: 8.6 MG/DL (ref 8.5–10.1)
CHLORIDE SERPL-SCNC: 100 MEQ/L (ref 98–107)
CREAT SERPL-MCNC: 0.82 MG/DL (ref 0.5–1)
EOSINOPHIL # BLD: 0 TH/MM3 (ref 0–0.4)
EOSINOPHIL NFR BLD: 0 % (ref 0–4)
ERYTHROCYTE [DISTWIDTH] IN BLOOD BY AUTOMATED COUNT: 13.6 % (ref 11.6–17.2)
GFR SERPLBLD BASED ON 1.73 SQ M-ARVRAT: 70 ML/MIN (ref 89–?)
GLUCOSE SERPL-MCNC: 132 MG/DL (ref 74–106)
HCO3 BLD-SCNC: 33.4 MEQ/L (ref 21–32)
HCT VFR BLD CALC: 38.2 % (ref 35–46)
HGB BLD-MCNC: 12.6 GM/DL (ref 11.6–15.3)
LYMPHOCYTES # BLD AUTO: 0.7 TH/MM3 (ref 1–4.8)
LYMPHOCYTES NFR BLD AUTO: 5.4 % (ref 9–44)
MCH RBC QN AUTO: 29.3 PG (ref 27–34)
MCHC RBC AUTO-ENTMCNC: 33 % (ref 32–36)
MCV RBC AUTO: 88.8 FL (ref 80–100)
MONOCYTE #: 0.5 TH/MM3 (ref 0–0.9)
MONOCYTES NFR BLD: 3.9 % (ref 0–8)
NEUTROPHILS # BLD AUTO: 12.6 TH/MM3 (ref 1.8–7.7)
NEUTROPHILS NFR BLD AUTO: 90.7 % (ref 16–70)
PLATELET # BLD: 239 TH/MM3 (ref 150–450)
PMV BLD AUTO: 8.8 FL (ref 7–11)
PROT SERPL-MCNC: 8.3 GM/DL (ref 6.4–8.2)
RBC # BLD AUTO: 4.3 MIL/MM3 (ref 4–5.3)
SODIUM SERPL-SCNC: 138 MEQ/L (ref 136–145)
WBC # BLD AUTO: 13.8 TH/MM3 (ref 4–11)

## 2018-01-30 RX ADMIN — OSELTAMIVIR PHOSPHATE SCH MG: 75 CAPSULE ORAL at 10:19

## 2018-01-30 RX ADMIN — METOPROLOL TARTRATE SCH MG: 50 TABLET, FILM COATED ORAL at 20:28

## 2018-01-30 RX ADMIN — LEVOFLOXACIN SCH MLS/HR: 5 INJECTION, SOLUTION INTRAVENOUS at 15:38

## 2018-01-30 RX ADMIN — IPRATROPIUM BROMIDE AND ALBUTEROL SULFATE SCH AMPULE: .5; 3 SOLUTION RESPIRATORY (INHALATION) at 15:32

## 2018-01-30 RX ADMIN — CHLORHEXIDINE GLUCONATE SCH PACK: 500 CLOTH TOPICAL at 04:00

## 2018-01-30 RX ADMIN — STANDARDIZED SENNA CONCENTRATE AND DOCUSATE SODIUM SCH TAB: 8.6; 5 TABLET, FILM COATED ORAL at 09:00

## 2018-01-30 RX ADMIN — FAMOTIDINE SCH MG: 20 TABLET, FILM COATED ORAL at 10:22

## 2018-01-30 RX ADMIN — Medication SCH ML: at 10:22

## 2018-01-30 RX ADMIN — IPRATROPIUM BROMIDE AND ALBUTEROL SULFATE SCH AMPULE: .5; 3 SOLUTION RESPIRATORY (INHALATION) at 07:53

## 2018-01-30 RX ADMIN — INSULIN ASPART SCH: 100 INJECTION, SOLUTION INTRAVENOUS; SUBCUTANEOUS at 17:00

## 2018-01-30 RX ADMIN — INSULIN ASPART SCH: 100 INJECTION, SOLUTION INTRAVENOUS; SUBCUTANEOUS at 08:00

## 2018-01-30 RX ADMIN — OSELTAMIVIR PHOSPHATE SCH MG: 75 CAPSULE ORAL at 20:28

## 2018-01-30 RX ADMIN — IPRATROPIUM BROMIDE AND ALBUTEROL SULFATE SCH AMPULE: .5; 3 SOLUTION RESPIRATORY (INHALATION) at 19:34

## 2018-01-30 RX ADMIN — INSULIN ASPART SCH: 100 INJECTION, SOLUTION INTRAVENOUS; SUBCUTANEOUS at 12:00

## 2018-01-30 RX ADMIN — ENOXAPARIN SODIUM SCH MG: 40 INJECTION SUBCUTANEOUS at 01:14

## 2018-01-30 RX ADMIN — IPRATROPIUM BROMIDE AND ALBUTEROL SULFATE SCH AMPULE: .5; 3 SOLUTION RESPIRATORY (INHALATION) at 23:50

## 2018-01-30 RX ADMIN — STANDARDIZED SENNA CONCENTRATE AND DOCUSATE SODIUM SCH TAB: 8.6; 5 TABLET, FILM COATED ORAL at 20:29

## 2018-01-30 RX ADMIN — FAMOTIDINE SCH MG: 20 TABLET, FILM COATED ORAL at 20:28

## 2018-01-30 RX ADMIN — METOPROLOL TARTRATE SCH MG: 50 TABLET, FILM COATED ORAL at 10:19

## 2018-01-30 RX ADMIN — IPRATROPIUM BROMIDE AND ALBUTEROL SULFATE SCH AMPULE: .5; 3 SOLUTION RESPIRATORY (INHALATION) at 04:31

## 2018-01-30 RX ADMIN — INSULIN ASPART SCH: 100 INJECTION, SOLUTION INTRAVENOUS; SUBCUTANEOUS at 21:00

## 2018-01-30 RX ADMIN — Medication SCH ML: at 20:29

## 2018-01-30 RX ADMIN — IPRATROPIUM BROMIDE AND ALBUTEROL SULFATE SCH AMPULE: .5; 3 SOLUTION RESPIRATORY (INHALATION) at 11:04

## 2018-01-30 RX ADMIN — LABETALOL HYDROCHLORIDE PRN MG: 5 INJECTION, SOLUTION INTRAVENOUS at 04:52

## 2018-01-30 NOTE — HHI.CCPN
Subjective


Remarks/Hospital Course


Hospital Course:





63-year-old  female with past medical history of asthma, hypertension, 

obesity, lymphedema  who presented to [facility] emergency 

department [place] with a three-day history of shortness of breath. She had 

wheezing and increased work of breathing upon EVAC arrival and was given 

nebulizer treatments and Solu-Medrol en route.  She was started on BiPAP 14/7. 

  She was given Duoneb x3 in the ED and appeared improved so Bipap mask was 

removed and she was taken for CTPA but she became more SOB and the imaging 

could not be completed. She was placed back on Bipap, given albuterol nebs x2 

and transferred to intensivist at [facility].  She states symptoms began 3 days ago 

with cough, rhinorrhea, subjective fever, sore throat, fatigue, and myalgias. 

She believed she had the flu and stated she had been exposed to others with 

similar symptoms.  She had progressive wheezing and shortness of breath and 

said that this is very similar to a prior asthma exacerbation that was 

triggered by URI and resulted in hospitalization on BiPAP in 1996.  Says she 

has had asthma since age 2 and has never been intubated in the past. She denies 

chest pain or hemoptysis. She has chronic lower extremity lymphedema and venous 

stasis changes.





Subjective:





1/30: seen and examined around 10am. delayed note entry. patient continues to 

be hypoxic and tachypneic, however slowly improving. influenza PCR still 

pending. denies complaints other than cough. does well with BiPAP and high flow 

NC but desats quickly off support. ROS otherwise negative.





Objective





Vital Signs








  Date Time  Temp Pulse Resp B/P (MAP) Pulse Ox O2 Delivery O2 Flow Rate FiO2


 


1/30/18 18:00  93      


 


1/30/18 16:00 98.0  34 138/63 (88) 99   


 


1/30/18 15:50      High Flow Nasal Cannula 30.00 80














Intake and Output   


 


 1/30/18 1/30/18 1/31/18





 08:00 16:00 00:00


 


Intake Total 240 ml 150 ml 360 ml


 


Output Total 450 ml  800 ml


 


Balance -210 ml 150 ml -440 ml








Result Diagram:  


1/30/18 0352                                                                   

             1/30/18 0352





Other Results





Microbiology








 Date/Time


Source Procedure


Growth Status


 


 


 1/28/18 18:15


Nasal Aspirate Influenza Types A,B Antigen (NICK) - Final


NEGATIVE FOR FLU A AND B ANTIGEN.... Complete





 1/29/18 19:00


Urine Other Legionella Antigen - Final


PRESUMPTIVE NEGATIVE FOR LEGIONELLA P... Complete


 


 1/29/18 19:00


Urine Other Streptococcus pneumoniae Antigen (M - Final


PRESUMPTIVE NEGATIVE FOR STREPTOCOCCU... Complete








Objective Remarks


GENERAL:  obese  female who is sitting up in [facility] bed on BiPAP.


SKIN: Warm and dry.


HEAD: Atraumatic. Normocephalic. 


EYES: Pupils equal and round. No scleral icterus. No injection or drainage. 


ENT: BiPAP mask in place.


CV: Regular rate and rhythm rhythm. No murmurs rubs or gallops. 


RESPIRATORY: On BiPAP, respiratory rate in mid 20s. significantly decreased BS 

bilaterally. scant expiratory wheezing. 


GASTROINTESTINAL: Abdomen soft, non-tender, nondistended. 


MUSCULOSKELETAL: Extremities without clubbing, cyanosis, bilateral edema with 

venous stasis changes


NEUROLOGICAL: Awake and alert, oriented. Motor grossly within normal limits.





A/P


Assessment and Plan


Assessment: 63yF with Asthma exacerbation and likely influenza pneumonia. 

continue NIPPV support- may take a few days to improve her clinical symptoms. 

continue tamiflu. keep in ICU- high risk for decompensation.





NEURO:


Tylenol as needed for pain or fever





RESP:


Acute hypercapnic respiratory failure on BiPAP


Acute asthma exacerbation


Remote history of tobacco abuse


Received DuoNeb 3, albuterol 2 in the emergency department.  Continue DuoNeb 

every 4 hours.  Albuterol every 2 hours as needed.  


Solu-Medrol 60 mg IV every 6 hours and transition to prednisone when she 

improves and is better able to take po. 


CXR with ?KAYCEE mass. CT chest negative for mass, RML infiltrate, small sub-

centimeter liver hypodensity.





CV:


Hypertension


Continue home dose of metoprolol 50  mg by mouth twice a day


Continue home dose of Norvasc 10 mg by mouth daily


BMP is normal.  Trend troponins which are negative.





GI: 


Obesity


Heart healthy diet





FEN/RENAL:


Monitor electrolytes and replace as indicated per replacement protocol





ID:


Viral URI /pneumonia


Influenza screen negative.  We'll send PCR.


Patient has symptoms highly suggestive of influenza during current season of 

high level flu activity. She is high risk due to asthma and age 63 so continue 

tamiflu. 


RML infiltrate: Levaquin for coverage of acquired organisms/atypicals


Blood cultures pending. Legionella and pneumococcal antigen. 





HEME:


Bilateral lymphedema


Bilateral lower extremity ultrasound negative for DVT





ENDO: Monitor bedside glucose and initiate low-dose insulin sliding scale as 

indicated while on stress dose steroids.





PROPH: SCDs and Lovenox 40 mg subcutaneous daily for DVT prophylaxis.  

Famotidine for stress ulcer prophylaxis





ACCESS: Peripheral IV





Full code











Galo Johnson MD Jan 30, 2018 18:57

## 2018-01-31 VITALS
SYSTOLIC BLOOD PRESSURE: 148 MMHG | HEART RATE: 87 BPM | OXYGEN SATURATION: 98 % | RESPIRATION RATE: 34 BRPM | DIASTOLIC BLOOD PRESSURE: 66 MMHG | TEMPERATURE: 97.9 F

## 2018-01-31 VITALS — HEART RATE: 85 BPM

## 2018-01-31 VITALS — OXYGEN SATURATION: 100 %

## 2018-01-31 VITALS
SYSTOLIC BLOOD PRESSURE: 154 MMHG | DIASTOLIC BLOOD PRESSURE: 68 MMHG | HEART RATE: 94 BPM | OXYGEN SATURATION: 98 % | TEMPERATURE: 97.2 F | RESPIRATION RATE: 18 BRPM

## 2018-01-31 VITALS
TEMPERATURE: 98.2 F | HEART RATE: 76 BPM | SYSTOLIC BLOOD PRESSURE: 143 MMHG | OXYGEN SATURATION: 100 % | RESPIRATION RATE: 19 BRPM | DIASTOLIC BLOOD PRESSURE: 64 MMHG

## 2018-01-31 VITALS
TEMPERATURE: 98.4 F | HEART RATE: 73 BPM | RESPIRATION RATE: 15 BRPM | SYSTOLIC BLOOD PRESSURE: 132 MMHG | DIASTOLIC BLOOD PRESSURE: 62 MMHG | OXYGEN SATURATION: 99 %

## 2018-01-31 VITALS
RESPIRATION RATE: 17 BRPM | OXYGEN SATURATION: 100 % | SYSTOLIC BLOOD PRESSURE: 143 MMHG | TEMPERATURE: 98.2 F | DIASTOLIC BLOOD PRESSURE: 65 MMHG | HEART RATE: 87 BPM

## 2018-01-31 VITALS — HEART RATE: 78 BPM

## 2018-01-31 VITALS — HEART RATE: 73 BPM

## 2018-01-31 VITALS — HEART RATE: 72 BPM

## 2018-01-31 VITALS
TEMPERATURE: 98.2 F | HEART RATE: 80 BPM | SYSTOLIC BLOOD PRESSURE: 141 MMHG | RESPIRATION RATE: 12 BRPM | DIASTOLIC BLOOD PRESSURE: 61 MMHG | OXYGEN SATURATION: 100 %

## 2018-01-31 VITALS — OXYGEN SATURATION: 99 %

## 2018-01-31 VITALS — HEART RATE: 79 BPM

## 2018-01-31 VITALS — HEART RATE: 80 BPM

## 2018-01-31 LAB
BUN SERPL-MCNC: 19 MG/DL (ref 7–18)
CALCIUM SERPL-MCNC: 8.7 MG/DL (ref 8.5–10.1)
CHLORIDE SERPL-SCNC: 103 MEQ/L (ref 98–107)
CREAT SERPL-MCNC: 0.66 MG/DL (ref 0.5–1)
ERYTHROCYTE [DISTWIDTH] IN BLOOD BY AUTOMATED COUNT: 13.5 % (ref 11.6–17.2)
GFR SERPLBLD BASED ON 1.73 SQ M-ARVRAT: 90 ML/MIN (ref 89–?)
GLUCOSE SERPL-MCNC: 152 MG/DL (ref 74–106)
HCO3 BLD-SCNC: 31.4 MEQ/L (ref 21–32)
HCT VFR BLD CALC: 38.6 % (ref 35–46)
HGB BLD-MCNC: 12.7 GM/DL (ref 11.6–15.3)
MCH RBC QN AUTO: 29.2 PG (ref 27–34)
MCHC RBC AUTO-ENTMCNC: 32.8 % (ref 32–36)
MCV RBC AUTO: 89 FL (ref 80–100)
PLATELET # BLD: 218 TH/MM3 (ref 150–450)
PMV BLD AUTO: 8.6 FL (ref 7–11)
RBC # BLD AUTO: 4.33 MIL/MM3 (ref 4–5.3)
SODIUM SERPL-SCNC: 139 MEQ/L (ref 136–145)
WBC # BLD AUTO: 12 TH/MM3 (ref 4–11)

## 2018-01-31 RX ADMIN — FAMOTIDINE SCH MG: 20 TABLET, FILM COATED ORAL at 08:34

## 2018-01-31 RX ADMIN — Medication SCH ML: at 20:26

## 2018-01-31 RX ADMIN — IPRATROPIUM BROMIDE AND ALBUTEROL SULFATE SCH AMPULE: .5; 3 SOLUTION RESPIRATORY (INHALATION) at 11:23

## 2018-01-31 RX ADMIN — IPRATROPIUM BROMIDE AND ALBUTEROL SULFATE SCH AMPULE: .5; 3 SOLUTION RESPIRATORY (INHALATION) at 07:53

## 2018-01-31 RX ADMIN — OSELTAMIVIR PHOSPHATE SCH MG: 75 CAPSULE ORAL at 20:26

## 2018-01-31 RX ADMIN — LEVOFLOXACIN SCH MLS/HR: 5 INJECTION, SOLUTION INTRAVENOUS at 15:39

## 2018-01-31 RX ADMIN — ENOXAPARIN SODIUM SCH MG: 40 INJECTION SUBCUTANEOUS at 00:57

## 2018-01-31 RX ADMIN — CHLORHEXIDINE GLUCONATE SCH PACK: 500 CLOTH TOPICAL at 04:00

## 2018-01-31 RX ADMIN — IPRATROPIUM BROMIDE AND ALBUTEROL SULFATE SCH AMPULE: .5; 3 SOLUTION RESPIRATORY (INHALATION) at 21:24

## 2018-01-31 RX ADMIN — OSELTAMIVIR PHOSPHATE SCH MG: 75 CAPSULE ORAL at 08:33

## 2018-01-31 RX ADMIN — INSULIN ASPART SCH: 100 INJECTION, SOLUTION INTRAVENOUS; SUBCUTANEOUS at 17:00

## 2018-01-31 RX ADMIN — IPRATROPIUM BROMIDE AND ALBUTEROL SULFATE SCH AMPULE: .5; 3 SOLUTION RESPIRATORY (INHALATION) at 03:37

## 2018-01-31 RX ADMIN — INSULIN ASPART SCH: 100 INJECTION, SOLUTION INTRAVENOUS; SUBCUTANEOUS at 08:00

## 2018-01-31 RX ADMIN — FAMOTIDINE SCH MG: 20 TABLET, FILM COATED ORAL at 20:26

## 2018-01-31 RX ADMIN — STANDARDIZED SENNA CONCENTRATE AND DOCUSATE SODIUM SCH TAB: 8.6; 5 TABLET, FILM COATED ORAL at 20:26

## 2018-01-31 RX ADMIN — STANDARDIZED SENNA CONCENTRATE AND DOCUSATE SODIUM SCH TAB: 8.6; 5 TABLET, FILM COATED ORAL at 08:34

## 2018-01-31 RX ADMIN — INSULIN ASPART SCH: 100 INJECTION, SOLUTION INTRAVENOUS; SUBCUTANEOUS at 12:00

## 2018-01-31 RX ADMIN — INSULIN ASPART SCH: 100 INJECTION, SOLUTION INTRAVENOUS; SUBCUTANEOUS at 20:47

## 2018-01-31 RX ADMIN — METOPROLOL TARTRATE SCH MG: 50 TABLET, FILM COATED ORAL at 20:26

## 2018-01-31 RX ADMIN — IPRATROPIUM BROMIDE AND ALBUTEROL SULFATE SCH AMPULE: .5; 3 SOLUTION RESPIRATORY (INHALATION) at 15:20

## 2018-01-31 RX ADMIN — Medication SCH ML: at 08:33

## 2018-01-31 RX ADMIN — METOPROLOL TARTRATE SCH MG: 50 TABLET, FILM COATED ORAL at 08:34

## 2018-01-31 NOTE — HHI.CCPN
Subjective


Remarks/Hospital Course


Hospital Course:





63-year-old  female with past medical history of asthma, hypertension, 

obesity, lymphedema  who presented to [facility] emergency 

department [place] with a three-day history of shortness of breath. She had 

wheezing and increased work of breathing upon [facility] arrival and was given 

nebulizer treatments and Solu-Medrol en route.  She was started on BiPAP 14/7. 

  She was given Duoneb x3 in the ED and appeared improved so Bipap mask was 

removed and she was taken for CTPA but she became more SOB and the imaging 

could not be completed. She was placed back on Bipap, given albuterol nebs x2 

and transferred [place] [facility]  She states symptoms began 3 days ago 

with cough, rhinorrhea, subjective fever, sore throat, fatigue, and myalgias. 

She believed she had the flu and stated she had been exposed to others with 

similar symptoms.  She had progressive wheezing and shortness of breath and 

said that this is very similar to a prior asthma exacerbation that was 

triggered by URI and resulted in hospitalization on BiPAP in 1996.  Says she 

has had asthma since age 2 and has never been intubated in the past. She denies 

chest pain or hemoptysis. She has chronic lower extremity lymphedema and venous 

stasis changes.





Subjective:





1/30: seen and examined around 10am. delayed note entry. patient continues to 

be hypoxic and tachypneic, however slowly improving. influenza PCR still 

pending. denies complaints other than cough. does well with BiPAP and high flow 

NC but desats quickly off support. ROS otherwise negative.





1/31: seen and examined around 09:30am. patient's hypoxia is mildly improved. 

now off BiPAP and on NRB. speaking in full sentences. still with expiratory 

wheezing. patient denies complaints. ROS negative.





Objective





Vital Signs








  Date Time  Temp Pulse Resp B/P (MAP) Pulse Ox O2 Delivery O2 Flow Rate FiO2


 


1/31/18 18:00  73      


 


1/31/18 16:00 98.4  15 132/62 (85) 99   


 


1/31/18 07:55      Partial Rebreather 12.00 


 


1/31/18 07:00        35














Intake and Output   


 


 1/31/18 1/31/18 2/1/18





 08:00 16:00 00:00


 


Intake Total 480 ml  870 ml


 


Output Total 600 ml  850 ml


 


Balance -120 ml  20 ml








Result Diagram:  


1/31/18 1120                                                                   

             1/31/18 1120





Other Results





Microbiology








 Date/Time


Source Procedure


Growth Status


 


 


 1/29/18 19:00


Urine Other Legionella Antigen - Final


PRESUMPTIVE NEGATIVE FOR LEGIONELLA P... Complete


 


 1/29/18 19:00


Urine Other Streptococcus pneumoniae Antigen (M - Final


PRESUMPTIVE NEGATIVE FOR STREPTOCOCCU... Complete








Objective Remarks


GENERAL:  obese  female who is sitting up in [facility] bed on NRB.


SKIN: Warm and dry.


HEAD: Atraumatic. Normocephalic. 


EYES: Pupils equal and round. No scleral icterus. No injection or drainage. 


ENT: BiPAP mask in place.


CV: Regular rate and rhythm rhythm. No murmurs rubs or gallops. 


RESPIRATORY: On NRB, respiratory rate in low 20s. improving aeration. scant 

expiratory wheezing. 


GASTROINTESTINAL: Abdomen soft, non-tender, nondistended. 


MUSCULOSKELETAL: Extremities without clubbing, cyanosis, bilateral edema with 

venous stasis changes


NEUROLOGICAL: Awake and alert, oriented. Motor grossly within normal limits.





A/P


Assessment and Plan


Assessment: 63yF with Asthma exacerbation and likely influenza pneumonia. 

continue NIPPV support- may take a few days to improve her clinical symptoms. 

continue tamiflu. keep in ICU- high risk for decompensation.





NEURO:


Tylenol as needed for pain or fever





RESP:


Acute hypercapnic respiratory failure on BiPAP


Acute asthma exacerbation


Remote history of tobacco abuse


Received DuoNeb 3, albuterol 2 in the emergency department.  Continue DuoNeb 

every 4 hours.  Albuterol every 2 hours as needed.  


Solu-Medrol 60 mg IV every 6 hours and transition to prednisone when she 

improves and is better able to take po. 


CXR with ?KAYCEE mass. CT chest negative for mass, RML infiltrate, small sub-

centimeter liver hypodensity.





CV:


Hypertension


Continue home dose of metoprolol 50  mg by mouth twice a day


Continue home dose of Norvasc 10 mg by mouth daily


BMP is normal.  Trend troponins which are negative.





GI: 


Obesity


Heart healthy diet





FEN/RENAL:


Monitor electrolytes and replace as indicated per replacement protocol





ID:


Viral URI /pneumonia


Influenza screen negative.  We'll send PCR.


Patient has symptoms highly suggestive of influenza during current season of 

high level flu activity. She is high risk due to asthma and age 63 so continue 

tamiflu. 


RML infiltrate: Levaquin for coverage of acquired organisms/atypicals


Blood cultures pending. Legionella and pneumococcal antigen. 





HEME:


Bilateral lymphedema


Bilateral lower extremity ultrasound negative for DVT





ENDO: Monitor bedside glucose and initiate low-dose insulin sliding scale as 

indicated while on stress dose steroids.





PROPH: SCDs and Lovenox 40 mg subcutaneous daily for DVT prophylaxis.  

Famotidine for stress ulcer prophylaxis





ACCESS: Peripheral IV





Full code











Galo Johnson MD Jan 31, 2018 19:27

## 2018-02-01 VITALS
DIASTOLIC BLOOD PRESSURE: 61 MMHG | SYSTOLIC BLOOD PRESSURE: 128 MMHG | RESPIRATION RATE: 21 BRPM | TEMPERATURE: 99.2 F | HEART RATE: 81 BPM | OXYGEN SATURATION: 92 %

## 2018-02-01 VITALS
TEMPERATURE: 96.5 F | RESPIRATION RATE: 22 BRPM | DIASTOLIC BLOOD PRESSURE: 61 MMHG | SYSTOLIC BLOOD PRESSURE: 139 MMHG | HEART RATE: 88 BPM | OXYGEN SATURATION: 94 %

## 2018-02-01 VITALS
RESPIRATION RATE: 16 BRPM | SYSTOLIC BLOOD PRESSURE: 133 MMHG | OXYGEN SATURATION: 99 % | HEART RATE: 64 BPM | DIASTOLIC BLOOD PRESSURE: 61 MMHG | TEMPERATURE: 98.5 F

## 2018-02-01 VITALS — OXYGEN SATURATION: 96 %

## 2018-02-01 VITALS — HEART RATE: 92 BPM

## 2018-02-01 VITALS
DIASTOLIC BLOOD PRESSURE: 63 MMHG | HEART RATE: 81 BPM | OXYGEN SATURATION: 98 % | SYSTOLIC BLOOD PRESSURE: 146 MMHG | TEMPERATURE: 97.3 F | RESPIRATION RATE: 21 BRPM

## 2018-02-01 VITALS
SYSTOLIC BLOOD PRESSURE: 139 MMHG | OXYGEN SATURATION: 97 % | RESPIRATION RATE: 18 BRPM | HEART RATE: 85 BPM | DIASTOLIC BLOOD PRESSURE: 61 MMHG | TEMPERATURE: 98.1 F

## 2018-02-01 VITALS
SYSTOLIC BLOOD PRESSURE: 147 MMHG | TEMPERATURE: 97.5 F | DIASTOLIC BLOOD PRESSURE: 65 MMHG | HEART RATE: 95 BPM | RESPIRATION RATE: 23 BRPM | OXYGEN SATURATION: 96 %

## 2018-02-01 VITALS — HEART RATE: 79 BPM

## 2018-02-01 VITALS
DIASTOLIC BLOOD PRESSURE: 58 MMHG | HEART RATE: 73 BPM | OXYGEN SATURATION: 95 % | TEMPERATURE: 98.4 F | SYSTOLIC BLOOD PRESSURE: 132 MMHG | RESPIRATION RATE: 17 BRPM

## 2018-02-01 VITALS — HEART RATE: 78 BPM

## 2018-02-01 VITALS — HEART RATE: 77 BPM

## 2018-02-01 VITALS — HEART RATE: 74 BPM

## 2018-02-01 LAB
BUN SERPL-MCNC: 22 MG/DL (ref 7–18)
CALCIUM SERPL-MCNC: 8.3 MG/DL (ref 8.5–10.1)
CHLORIDE SERPL-SCNC: 103 MEQ/L (ref 98–107)
CREAT SERPL-MCNC: 0.71 MG/DL (ref 0.5–1)
ERYTHROCYTE [DISTWIDTH] IN BLOOD BY AUTOMATED COUNT: 13.7 % (ref 11.6–17.2)
GFR SERPLBLD BASED ON 1.73 SQ M-ARVRAT: 83 ML/MIN (ref 89–?)
GLUCOSE SERPL-MCNC: 156 MG/DL (ref 74–106)
HCO3 BLD-SCNC: 30.8 MEQ/L (ref 21–32)
HCT VFR BLD CALC: 38.5 % (ref 35–46)
HGB BLD-MCNC: 12.7 GM/DL (ref 11.6–15.3)
MCH RBC QN AUTO: 29.3 PG (ref 27–34)
MCHC RBC AUTO-ENTMCNC: 33.1 % (ref 32–36)
MCV RBC AUTO: 88.5 FL (ref 80–100)
PLATELET # BLD: 239 TH/MM3 (ref 150–450)
PMV BLD AUTO: 9 FL (ref 7–11)
RBC # BLD AUTO: 4.34 MIL/MM3 (ref 4–5.3)
SODIUM SERPL-SCNC: 140 MEQ/L (ref 136–145)
WBC # BLD AUTO: 11 TH/MM3 (ref 4–11)

## 2018-02-01 RX ADMIN — METOPROLOL TARTRATE SCH MG: 50 TABLET, FILM COATED ORAL at 20:37

## 2018-02-01 RX ADMIN — OSELTAMIVIR PHOSPHATE SCH MG: 75 CAPSULE ORAL at 08:12

## 2018-02-01 RX ADMIN — MAGNESIUM SULFATE IN DEXTROSE SCH MLS/HR: 10 INJECTION, SOLUTION INTRAVENOUS at 20:15

## 2018-02-01 RX ADMIN — INSULIN ASPART SCH: 100 INJECTION, SOLUTION INTRAVENOUS; SUBCUTANEOUS at 12:00

## 2018-02-01 RX ADMIN — IPRATROPIUM BROMIDE AND ALBUTEROL SULFATE SCH AMPULE: .5; 3 SOLUTION RESPIRATORY (INHALATION) at 15:17

## 2018-02-01 RX ADMIN — IPRATROPIUM BROMIDE AND ALBUTEROL SULFATE SCH AMPULE: .5; 3 SOLUTION RESPIRATORY (INHALATION) at 03:33

## 2018-02-01 RX ADMIN — ENOXAPARIN SODIUM SCH MG: 40 INJECTION SUBCUTANEOUS at 00:30

## 2018-02-01 RX ADMIN — FAMOTIDINE SCH MG: 20 TABLET, FILM COATED ORAL at 08:12

## 2018-02-01 RX ADMIN — LEVOFLOXACIN SCH MLS/HR: 5 INJECTION, SOLUTION INTRAVENOUS at 17:16

## 2018-02-01 RX ADMIN — METOPROLOL TARTRATE SCH MG: 50 TABLET, FILM COATED ORAL at 08:12

## 2018-02-01 RX ADMIN — FAMOTIDINE SCH MG: 20 TABLET, FILM COATED ORAL at 20:37

## 2018-02-01 RX ADMIN — INSULIN ASPART SCH: 100 INJECTION, SOLUTION INTRAVENOUS; SUBCUTANEOUS at 17:00

## 2018-02-01 RX ADMIN — ACETAMINOPHEN PRN MG: 325 TABLET ORAL at 17:13

## 2018-02-01 RX ADMIN — Medication SCH ML: at 08:12

## 2018-02-01 RX ADMIN — MAGNESIUM SULFATE IN DEXTROSE SCH MLS/HR: 10 INJECTION, SOLUTION INTRAVENOUS at 20:35

## 2018-02-01 RX ADMIN — ENOXAPARIN SODIUM SCH MG: 40 INJECTION SUBCUTANEOUS at 23:33

## 2018-02-01 RX ADMIN — IPRATROPIUM BROMIDE AND ALBUTEROL SULFATE SCH AMPULE: .5; 3 SOLUTION RESPIRATORY (INHALATION) at 08:17

## 2018-02-01 RX ADMIN — STANDARDIZED SENNA CONCENTRATE AND DOCUSATE SODIUM SCH TAB: 8.6; 5 TABLET, FILM COATED ORAL at 20:37

## 2018-02-01 RX ADMIN — IPRATROPIUM BROMIDE AND ALBUTEROL SULFATE SCH AMPULE: .5; 3 SOLUTION RESPIRATORY (INHALATION) at 00:28

## 2018-02-01 RX ADMIN — STANDARDIZED SENNA CONCENTRATE AND DOCUSATE SODIUM SCH TAB: 8.6; 5 TABLET, FILM COATED ORAL at 08:12

## 2018-02-01 RX ADMIN — Medication SCH ML: at 20:36

## 2018-02-01 RX ADMIN — INSULIN ASPART SCH: 100 INJECTION, SOLUTION INTRAVENOUS; SUBCUTANEOUS at 20:41

## 2018-02-01 RX ADMIN — OSELTAMIVIR PHOSPHATE SCH MG: 75 CAPSULE ORAL at 20:37

## 2018-02-01 RX ADMIN — INSULIN ASPART SCH: 100 INJECTION, SOLUTION INTRAVENOUS; SUBCUTANEOUS at 08:00

## 2018-02-01 RX ADMIN — IPRATROPIUM BROMIDE AND ALBUTEROL SULFATE SCH AMPULE: .5; 3 SOLUTION RESPIRATORY (INHALATION) at 11:31

## 2018-02-01 NOTE — HHI.CCPN
Subjective


Remarks/Hospital Course


Hospital Course:





63-year-old  female with past medical history of asthma, hypertension, 

obesity, lymphedema  who presented to [facility] emergency 

department [place] with a three-day history of shortness of breath. She had 

wheezing and increased work of breathing upon [facility] arrival and was given 

nebulizer treatments and Solu-Medrol en route.  She was started on BiPAP 14/7. 

  She was given Duoneb x3 in the ED and appeared improved so Bipap mask was 

removed and she was taken for CTPA but she became more SOB and the imaging 

could not be completed. She was placed back on Bipap, given albuterol nebs x2 

and transferred to intensivist at [facility].  She states symptoms began 3 days ago 

with cough, rhinorrhea, subjective fever, sore throat, fatigue, and myalgias. 

She believed she had the flu and stated she had been exposed to others with 

similar symptoms.  She had progressive wheezing and shortness of breath and 

said that this is very similar to a prior asthma exacerbation that was 

triggered by URI and resulted in hospitalization on BiPAP in 1996.  Says she 

has had asthma since age 2 and has never been intubated in the past. She denies 

chest pain or hemoptysis. She has chronic lower extremity lymphedema and venous 

stasis changes.





Subjective:





1/30: seen and examined around 10am. delayed note entry. patient continues to 

be hypoxic and tachypneic, however slowly improving. influenza PCR still 

pending. denies complaints other than cough. does well with BiPAP and high flow 

NC but desats quickly off support. ROS otherwise negative.





1/31: seen and examined around 09:30am. patient's hypoxia is mildly improved. 

now off BiPAP and on NRB. speaking in full sentences. still with expiratory 

wheezing. patient denies complaints. ROS negative.





2/1: continues to make very slow progress towards improving: down to venti 

mask. still very anxious and wants to get out of the hospital. still wheezing. 

ROS negative.





Objective





Vital Signs








  Date Time  Temp Pulse Resp B/P (MAP) Pulse Ox O2 Delivery O2 Flow Rate FiO2


 


2/1/18 14:00  78      


 


2/1/18 12:00 96.5  22 139/61 (87) 94   


 


2/1/18 08:17      Partial Rebreather 15.00 60














Intake and Output   


 


 2/1/18 2/1/18 2/2/18





 08:00 16:00 00:00


 


Intake Total 400 ml  


 


Output Total 350 ml  


 


Balance 50 ml  








Result Diagram:  


2/1/18 0539                                                                    

            2/1/18 0539





Objective Remarks


GENERAL:  obese  female who is sitting up in [facility] bed on 50% ventimask.


SKIN: Warm and dry.


HEAD: Atraumatic. Normocephalic. 


EYES: Pupils equal and round. No scleral icterus. No injection or drainage. 


ENT: BiPAP mask in place.


CV: Regular rate and rhythm rhythm. No murmurs rubs or gallops. 


RESPIRATORY: On 50% ventimask, respiratory rate in high teens. improving 

aeration. scant expiratory wheezing. 


GASTROINTESTINAL: Abdomen soft, non-tender, nondistended. 


MUSCULOSKELETAL: Extremities without clubbing, cyanosis, bilateral edema with 

venous stasis changes


NEUROLOGICAL: Awake and alert, oriented. Motor grossly within normal limits.





A/P


Assessment and Plan


Assessment: 63yF with Asthma exacerbation and likely influenza pneumonia. 

continue NIPPV support- may take a few days to improve her clinical symptoms. 

continue tamiflu. keep in ICU- high risk for decompensation.





NEURO:


Tylenol as needed for pain or fever





RESP:


Acute hypercapnic respiratory failure on BiPAP


Acute asthma exacerbation


Remote history of tobacco abuse


Received DuoNeb 3, albuterol 2 in the emergency department.  Continue DuoNeb 

every 4 hours.  Albuterol every 2 hours as needed.  


Solu-Medrol 60 mg IV every 6 hours and transition to prednisone when she 

improves and is better able to take po. 


CXR with ?KAYCEE mass. CT chest negative for mass, RML infiltrate, small sub-

centimeter liver hypodensity.





CV:


Hypertension


Continue home dose of metoprolol 50  mg by mouth twice a day


Continue home dose of Norvasc 10 mg by mouth daily


BMP is normal.  Trend troponins which are negative.





GI: 


Obesity


Heart healthy diet





FEN/RENAL:


Monitor electrolytes and replace as indicated per replacement protocol





ID:


Viral URI /pneumonia


Influenza screen negative.  PCR pending.


Patient has symptoms highly suggestive of influenza during current season of 

high level flu activity. She is high risk due to asthma and age 63 so continue 

tamiflu. 


RML infiltrate: Levaquin for coverage of acquired organisms/atypicals


Blood cultures NGTD. Legionella and pneumococcal negative.. 





HEME:


Bilateral lymphedema


Bilateral lower extremity ultrasound negative for DVT





ENDO: Monitor bedside glucose and initiate low-dose insulin sliding scale as 

indicated while on stress dose steroids.





PROPH: SCDs and Lovenox 40 mg subcutaneous daily for DVT prophylaxis.  

Famotidine for stress ulcer prophylaxis





ACCESS: Peripheral IV





Full code











Galo Johnson MD Feb 1, 2018 19:04

## 2018-02-02 VITALS
RESPIRATION RATE: 26 BRPM | SYSTOLIC BLOOD PRESSURE: 146 MMHG | TEMPERATURE: 96.8 F | OXYGEN SATURATION: 89 % | HEART RATE: 104 BPM | DIASTOLIC BLOOD PRESSURE: 65 MMHG

## 2018-02-02 VITALS
OXYGEN SATURATION: 96 % | DIASTOLIC BLOOD PRESSURE: 65 MMHG | SYSTOLIC BLOOD PRESSURE: 146 MMHG | TEMPERATURE: 97 F | HEART RATE: 75 BPM | RESPIRATION RATE: 16 BRPM

## 2018-02-02 VITALS
SYSTOLIC BLOOD PRESSURE: 142 MMHG | DIASTOLIC BLOOD PRESSURE: 63 MMHG | HEART RATE: 88 BPM | OXYGEN SATURATION: 93 % | TEMPERATURE: 97.2 F | RESPIRATION RATE: 24 BRPM

## 2018-02-02 VITALS
TEMPERATURE: 97.2 F | OXYGEN SATURATION: 95 % | SYSTOLIC BLOOD PRESSURE: 151 MMHG | RESPIRATION RATE: 22 BRPM | HEART RATE: 93 BPM | DIASTOLIC BLOOD PRESSURE: 67 MMHG

## 2018-02-02 VITALS — OXYGEN SATURATION: 95 %

## 2018-02-02 VITALS
OXYGEN SATURATION: 94 % | RESPIRATION RATE: 22 BRPM | TEMPERATURE: 97 F | HEART RATE: 110 BPM | SYSTOLIC BLOOD PRESSURE: 153 MMHG | DIASTOLIC BLOOD PRESSURE: 64 MMHG

## 2018-02-02 VITALS — OXYGEN SATURATION: 97 %

## 2018-02-02 LAB
BUN SERPL-MCNC: 24 MG/DL (ref 7–18)
CALCIUM SERPL-MCNC: 8.7 MG/DL (ref 8.5–10.1)
CHLORIDE SERPL-SCNC: 103 MEQ/L (ref 98–107)
CREAT SERPL-MCNC: 0.63 MG/DL (ref 0.5–1)
ERYTHROCYTE [DISTWIDTH] IN BLOOD BY AUTOMATED COUNT: 13.9 % (ref 11.6–17.2)
GFR SERPLBLD BASED ON 1.73 SQ M-ARVRAT: 95 ML/MIN (ref 89–?)
GLUCOSE SERPL-MCNC: 154 MG/DL (ref 74–106)
HCO3 BLD-SCNC: 29.7 MEQ/L (ref 21–32)
HCT VFR BLD CALC: 40.3 % (ref 35–46)
HGB BLD-MCNC: 13.2 GM/DL (ref 11.6–15.3)
MCH RBC QN AUTO: 29.2 PG (ref 27–34)
MCHC RBC AUTO-ENTMCNC: 32.8 % (ref 32–36)
MCV RBC AUTO: 89 FL (ref 80–100)
PLATELET # BLD: 223 TH/MM3 (ref 150–450)
PMV BLD AUTO: 8.7 FL (ref 7–11)
RBC # BLD AUTO: 4.53 MIL/MM3 (ref 4–5.3)
SODIUM SERPL-SCNC: 139 MEQ/L (ref 136–145)
WBC # BLD AUTO: 9.8 TH/MM3 (ref 4–11)

## 2018-02-02 RX ADMIN — IPRATROPIUM BROMIDE AND ALBUTEROL SULFATE SCH AMPULE: .5; 3 SOLUTION RESPIRATORY (INHALATION) at 15:33

## 2018-02-02 RX ADMIN — INSULIN ASPART SCH: 100 INJECTION, SOLUTION INTRAVENOUS; SUBCUTANEOUS at 17:35

## 2018-02-02 RX ADMIN — METOPROLOL TARTRATE SCH MG: 50 TABLET, FILM COATED ORAL at 08:58

## 2018-02-02 RX ADMIN — STANDARDIZED SENNA CONCENTRATE AND DOCUSATE SODIUM SCH TAB: 8.6; 5 TABLET, FILM COATED ORAL at 20:20

## 2018-02-02 RX ADMIN — INSULIN ASPART SCH: 100 INJECTION, SOLUTION INTRAVENOUS; SUBCUTANEOUS at 20:21

## 2018-02-02 RX ADMIN — IPRATROPIUM BROMIDE AND ALBUTEROL SULFATE SCH AMPULE: .5; 3 SOLUTION RESPIRATORY (INHALATION) at 19:37

## 2018-02-02 RX ADMIN — LEVOFLOXACIN SCH MLS/HR: 5 INJECTION, SOLUTION INTRAVENOUS at 16:26

## 2018-02-02 RX ADMIN — STANDARDIZED SENNA CONCENTRATE AND DOCUSATE SODIUM SCH TAB: 8.6; 5 TABLET, FILM COATED ORAL at 08:58

## 2018-02-02 RX ADMIN — CHLORHEXIDINE GLUCONATE SCH PACK: 500 CLOTH TOPICAL at 04:00

## 2018-02-02 RX ADMIN — FAMOTIDINE SCH MG: 20 TABLET, FILM COATED ORAL at 20:19

## 2018-02-02 RX ADMIN — INSULIN ASPART SCH: 100 INJECTION, SOLUTION INTRAVENOUS; SUBCUTANEOUS at 12:00

## 2018-02-02 RX ADMIN — Medication SCH ML: at 20:20

## 2018-02-02 RX ADMIN — METOPROLOL TARTRATE SCH MG: 50 TABLET, FILM COATED ORAL at 20:20

## 2018-02-02 RX ADMIN — IPRATROPIUM BROMIDE AND ALBUTEROL SULFATE SCH AMPULE: .5; 3 SOLUTION RESPIRATORY (INHALATION) at 00:00

## 2018-02-02 RX ADMIN — INSULIN ASPART SCH: 100 INJECTION, SOLUTION INTRAVENOUS; SUBCUTANEOUS at 08:00

## 2018-02-02 RX ADMIN — FAMOTIDINE SCH MG: 20 TABLET, FILM COATED ORAL at 08:58

## 2018-02-02 RX ADMIN — OSELTAMIVIR PHOSPHATE SCH MG: 75 CAPSULE ORAL at 12:38

## 2018-02-02 RX ADMIN — IPRATROPIUM BROMIDE AND ALBUTEROL SULFATE SCH AMPULE: .5; 3 SOLUTION RESPIRATORY (INHALATION) at 03:26

## 2018-02-02 RX ADMIN — CHLORHEXIDINE GLUCONATE SCH PACK: 500 CLOTH TOPICAL at 04:35

## 2018-02-02 NOTE — HHI.PR
Subjective


Remarks


Patient is still very short of breath and hypoxemic, requiring Venturi mask.





Objective


Vitals





Vital Signs








  Date Time  Temp Pulse Resp B/P (MAP) Pulse Ox O2 Delivery O2 Flow Rate FiO2


 


2/2/18 09:36     95 Venturi Mask 6.00 


 


2/2/18 08:00 96.8 104 26 146/65 (92) 89   


 


2/2/18 08:00      Partial Non-Rebreather 6.00 


 


2/2/18 04:10 97.2 93 22 151/67 (95) 95   


 


2/2/18 00:34     97 Venturi Mask  50


 


2/1/18 22:00      Partial Non-Rebreather 6.00 


 


2/1/18 21:32 97.3 81 21 146/63 (90) 98   


 


2/1/18 20:00     94 Partial Non-Rebreather 6.00 


 


2/1/18 20:00 98.4 73 17 132/58 (82) 95   


 


2/1/18 20:00  73      


 


2/1/18 18:00  74      


 


2/1/18 16:00  81      


 


2/1/18 16:00 99.2 81 21 128/61 (83) 92   


 


2/1/18 14:00  78      














I/O      


 


 2/1/18 2/1/18 2/1/18 2/2/18 2/2/18 2/2/18





 06:59 14:59 22:59 06:59 14:59 22:59


 


Intake Total 400 ml  1310 ml 240 ml  


 


Output Total 350 ml  1050 ml   


 


Balance 50 ml  260 ml 240 ml  


 


      


 


Intake Oral 400 ml  1160 ml 240 ml  


 


IV Total   150 ml   


 


Output Urine Total 350 ml  1050 ml   


 


# Voids   1 1  


 


# Bowel Movements 0  0 0  








Result Diagram:  


2/2/18 0414                                                                    

            2/2/18 0414





Objective Remarks


GENERAL: Obese female. On venturi mask.


CARDIOVASCULAR: Normal rate and regular rhythm without murmurs, gallops, or 

rubs. 


RESPIRATORY: Poor air movement.  Diminished breath sounds bilaterally. Faint 

expiratory wheezing.


GASTROINTESTINAL: Abdomen soft, non-tender, non-distended. Normal active bowel 

sounds


MUSCULOSKELETAL: Extremities without cyanosis, or edema.


NEURO:  Alert & Oriented x4 to person, place, time, situation.  Moves all ext x4


PSYCH: Appropriate mood and affect.





A/P


Assessment and Plan


62 Y/O F with respiratory failure secondary to pneumonia and asthma 

exacerbation. Patient required Bipap. S/P ICU course. 





Acute hypercapnic respiratory failure on BiPAP


Acute asthma exacerbation


Remote history of tobacco abuse


Received DuoNeb 3, albuterol 2 in the emergency department.  Continue DuoNeb 

every 4 hours.  Albuterol every 2 hours as needed.  


Continue Solu-Medrol 60 mg IV every 6 hours


CXR with ?KAYCEE mass. CT chest negative for mass, RML infiltrate, small sub-

centimeter liver hypodensity.


Continue antibiotics





Hypertension


Continue home dose of metoprolol 50  mg by mouth twice a day


Continue home dose of Norvasc 10 mg by mouth daily





Obesity


Heart healthy diet





Pneumonia


Influenza screen negative. 


Patient has symptoms highly suggestive of influenza during current season of 

high level flu activity. She is high risk due to asthma and age 63 so she 

received Tamiflu X5 days. 


RML infiltrate: Levaquin for coverage of acquired organisms/atypicals


Blood cultures NGTD. Legionella and pneumococcal negative.. 





Bilateral lymphedema


Bilateral lower extremity ultrasound negative for DVT





PROPH: SCDs and Lovenox 40 mg subcutaneous daily for DVT prophylaxis.  

Famotidine for stress ulcer prophylaxis





ACCESS: Peripheral IV





Full code


Discharge Planning


Will likely need about 2 more days.  Anticipated discharge home.  Unclear if 

she will need oxygen.











David Hoffman MD Feb 2, 2018 12:30

## 2018-02-03 VITALS
RESPIRATION RATE: 19 BRPM | SYSTOLIC BLOOD PRESSURE: 134 MMHG | TEMPERATURE: 97.4 F | HEART RATE: 86 BPM | DIASTOLIC BLOOD PRESSURE: 61 MMHG | OXYGEN SATURATION: 96 %

## 2018-02-03 VITALS
HEART RATE: 75 BPM | RESPIRATION RATE: 24 BRPM | DIASTOLIC BLOOD PRESSURE: 67 MMHG | TEMPERATURE: 96.7 F | OXYGEN SATURATION: 96 % | SYSTOLIC BLOOD PRESSURE: 149 MMHG

## 2018-02-03 VITALS
OXYGEN SATURATION: 94 % | TEMPERATURE: 96.6 F | DIASTOLIC BLOOD PRESSURE: 60 MMHG | SYSTOLIC BLOOD PRESSURE: 139 MMHG | HEART RATE: 76 BPM | RESPIRATION RATE: 20 BRPM

## 2018-02-03 VITALS — OXYGEN SATURATION: 96 %

## 2018-02-03 VITALS
SYSTOLIC BLOOD PRESSURE: 141 MMHG | TEMPERATURE: 97.6 F | OXYGEN SATURATION: 92 % | RESPIRATION RATE: 20 BRPM | DIASTOLIC BLOOD PRESSURE: 63 MMHG | HEART RATE: 87 BPM

## 2018-02-03 VITALS — OXYGEN SATURATION: 94 %

## 2018-02-03 LAB
BUN SERPL-MCNC: 27 MG/DL (ref 7–18)
CALCIUM SERPL-MCNC: 8.4 MG/DL (ref 8.5–10.1)
CHLORIDE SERPL-SCNC: 102 MEQ/L (ref 98–107)
CREAT SERPL-MCNC: 0.92 MG/DL (ref 0.5–1)
ERYTHROCYTE [DISTWIDTH] IN BLOOD BY AUTOMATED COUNT: 13.8 % (ref 11.6–17.2)
GFR SERPLBLD BASED ON 1.73 SQ M-ARVRAT: 61 ML/MIN (ref 89–?)
GLUCOSE SERPL-MCNC: 154 MG/DL (ref 74–106)
HCO3 BLD-SCNC: 34 MEQ/L (ref 21–32)
HCT VFR BLD CALC: 38.2 % (ref 35–46)
HGB BLD-MCNC: 12.7 GM/DL (ref 11.6–15.3)
MCH RBC QN AUTO: 29.5 PG (ref 27–34)
MCHC RBC AUTO-ENTMCNC: 33.2 % (ref 32–36)
MCV RBC AUTO: 88.8 FL (ref 80–100)
PLATELET # BLD: 227 TH/MM3 (ref 150–450)
PMV BLD AUTO: 9.2 FL (ref 7–11)
RBC # BLD AUTO: 4.31 MIL/MM3 (ref 4–5.3)
SODIUM SERPL-SCNC: 140 MEQ/L (ref 136–145)
WBC # BLD AUTO: 8.6 TH/MM3 (ref 4–11)

## 2018-02-03 RX ADMIN — CHLORHEXIDINE GLUCONATE SCH PACK: 500 CLOTH TOPICAL at 01:04

## 2018-02-03 RX ADMIN — Medication SCH ML: at 22:49

## 2018-02-03 RX ADMIN — Medication SCH ML: at 08:50

## 2018-02-03 RX ADMIN — METOPROLOL TARTRATE SCH MG: 50 TABLET, FILM COATED ORAL at 22:49

## 2018-02-03 RX ADMIN — IPRATROPIUM BROMIDE AND ALBUTEROL SULFATE SCH AMPULE: .5; 3 SOLUTION RESPIRATORY (INHALATION) at 08:16

## 2018-02-03 RX ADMIN — FAMOTIDINE SCH MG: 20 TABLET, FILM COATED ORAL at 22:49

## 2018-02-03 RX ADMIN — INSULIN ASPART SCH: 100 INJECTION, SOLUTION INTRAVENOUS; SUBCUTANEOUS at 17:00

## 2018-02-03 RX ADMIN — INSULIN ASPART SCH: 100 INJECTION, SOLUTION INTRAVENOUS; SUBCUTANEOUS at 22:50

## 2018-02-03 RX ADMIN — INSULIN ASPART SCH: 100 INJECTION, SOLUTION INTRAVENOUS; SUBCUTANEOUS at 08:00

## 2018-02-03 RX ADMIN — IPRATROPIUM BROMIDE AND ALBUTEROL SULFATE SCH AMPULE: .5; 3 SOLUTION RESPIRATORY (INHALATION) at 12:00

## 2018-02-03 RX ADMIN — FAMOTIDINE SCH MG: 20 TABLET, FILM COATED ORAL at 08:48

## 2018-02-03 RX ADMIN — INSULIN ASPART SCH: 100 INJECTION, SOLUTION INTRAVENOUS; SUBCUTANEOUS at 12:00

## 2018-02-03 RX ADMIN — LEVOFLOXACIN SCH MLS/HR: 5 INJECTION, SOLUTION INTRAVENOUS at 16:07

## 2018-02-03 RX ADMIN — IPRATROPIUM BROMIDE AND ALBUTEROL SULFATE SCH AMPULE: .5; 3 SOLUTION RESPIRATORY (INHALATION) at 19:34

## 2018-02-03 RX ADMIN — STANDARDIZED SENNA CONCENTRATE AND DOCUSATE SODIUM SCH TAB: 8.6; 5 TABLET, FILM COATED ORAL at 21:00

## 2018-02-03 RX ADMIN — IPRATROPIUM BROMIDE AND ALBUTEROL SULFATE SCH AMPULE: .5; 3 SOLUTION RESPIRATORY (INHALATION) at 15:48

## 2018-02-03 RX ADMIN — STANDARDIZED SENNA CONCENTRATE AND DOCUSATE SODIUM SCH TAB: 8.6; 5 TABLET, FILM COATED ORAL at 08:50

## 2018-02-03 RX ADMIN — ENOXAPARIN SODIUM SCH MG: 40 INJECTION SUBCUTANEOUS at 01:04

## 2018-02-03 RX ADMIN — METOPROLOL TARTRATE SCH MG: 50 TABLET, FILM COATED ORAL at 08:50

## 2018-02-03 NOTE — HHI.PR
Subjective


Remarks


Patient is still requiring Venturi mask today.  She admits to having some 

anxiety which makes her respiratory symptoms worse.





Objective


Vitals





Vital Signs








  Date Time  Temp Pulse Resp B/P (MAP) Pulse Ox O2 Delivery O2 Flow Rate FiO2


 


2/3/18 08:16     96 Venturi Mask 6.00 50


 


2/3/18 08:00 96.7 75 24 149/67 (94) 96   


 


2/3/18 01:14     96 Venturi Mask 6.00 50


 


2/3/18 00:15 96.6 76 20 139/60 (86) 94   


 


2/2/18 20:10 97.0 110 22 153/64 (93) 94   


 


2/2/18 20:00      Partial Non-Rebreather 6.00 


 


2/2/18 16:00 97.2 88 24 142/63 (89) 93   


 


2/2/18 15:33     95 Venturi Mask 6.00 50


 


2/2/18 12:00 97.0 75 16 146/65 (92) 96   














I/O      


 


 2/2/18 2/2/18 2/2/18 2/3/18 2/3/18 2/3/18





 07:00 15:00 23:00 07:00 15:00 23:00


 


Intake Total 240 ml  1220 ml 480 ml  


 


Output Total  800 ml    


 


Balance 240 ml -800 ml 1220 ml 480 ml  


 


      


 


Intake Oral 240 ml  1220 ml 480 ml  


 


Output Urine Total  800 ml    


 


# Voids 1  5 2  


 


# Bowel Movements 0  1 0  








Result Diagram:  


2/3/18 0501                                                                    

            2/3/18 0501





Objective Remarks


GENERAL: Obese female. On venturi mask.


CARDIOVASCULAR: Normal rate and regular rhythm without murmurs, gallops, or 

rubs. 


RESPIRATORY: Poor air movement.  Diminished breath sounds bilaterally. Faint 

expiratory wheezing.  Slightly better today.


GASTROINTESTINAL: Abdomen soft, non-tender, non-distended. Normal active bowel 

sounds


MUSCULOSKELETAL: Extremities without cyanosis, or edema.


NEURO:  Alert & Oriented x4 to person, place, time, situation.  Moves all ext x4


PSYCH: Appropriate mood and affect.





A/P


Assessment and Plan


62 Y/O F with respiratory failure secondary to pneumonia and asthma 

exacerbation. Patient required Bipap. S/P ICU course. 





Acute hypercapnic respiratory failure on BiPAP


Acute asthma exacerbation


Remote history of tobacco abuse


Received DuoNeb 3, albuterol 2 in the emergency department.  Continue DuoNeb 

every 4 hours.  Albuterol every 2 hours as needed.  


Continue Solu-Medrol 60 mg IV every 6 hours


CXR with ?KAYCEE mass. CT chest negative for mass, RML infiltrate, small sub-

centimeter liver hypodensity.


Continue IV antibiotics





Hypertension


Continue home dose of metoprolol 50  mg by mouth twice a day


Continue home dose of Norvasc 10 mg by mouth daily





Obesity


Heart healthy diet





Pneumonia


Influenza screen negative. 


Patient has symptoms highly suggestive of influenza during current season of 

high level flu activity. She is high risk due to asthma and age 63 so she 

received Tamiflu X5 days. 


RML infiltrate: Levaquin for coverage of acquired organisms/atypicals


Blood cultures NGTD. Legionella and pneumococcal negative.. 





Bilateral lymphedema


Bilateral lower extremity ultrasound negative for DVT





PROPH: SCDs and Lovenox 40 mg subcutaneous daily for DVT prophylaxis.  

Famotidine for stress ulcer prophylaxis





ACCESS: Peripheral IV





Full code


Discharge Planning


Will likely need a couple more days.  May need SNF. PT to evaluate











David Hoffman MD Feb 3, 2018 11:32

## 2018-02-04 VITALS
DIASTOLIC BLOOD PRESSURE: 65 MMHG | TEMPERATURE: 97 F | SYSTOLIC BLOOD PRESSURE: 159 MMHG | OXYGEN SATURATION: 96 % | RESPIRATION RATE: 20 BRPM | HEART RATE: 72 BPM

## 2018-02-04 VITALS — OXYGEN SATURATION: 92 %

## 2018-02-04 VITALS
SYSTOLIC BLOOD PRESSURE: 112 MMHG | RESPIRATION RATE: 15 BRPM | TEMPERATURE: 96.4 F | OXYGEN SATURATION: 93 % | DIASTOLIC BLOOD PRESSURE: 59 MMHG | HEART RATE: 82 BPM

## 2018-02-04 VITALS
TEMPERATURE: 96.7 F | HEART RATE: 84 BPM | DIASTOLIC BLOOD PRESSURE: 68 MMHG | SYSTOLIC BLOOD PRESSURE: 167 MMHG | OXYGEN SATURATION: 95 % | RESPIRATION RATE: 22 BRPM

## 2018-02-04 VITALS — OXYGEN SATURATION: 93 %

## 2018-02-04 VITALS
SYSTOLIC BLOOD PRESSURE: 154 MMHG | RESPIRATION RATE: 21 BRPM | TEMPERATURE: 96.9 F | HEART RATE: 86 BPM | DIASTOLIC BLOOD PRESSURE: 72 MMHG | OXYGEN SATURATION: 94 %

## 2018-02-04 VITALS
TEMPERATURE: 98.2 F | OXYGEN SATURATION: 92 % | DIASTOLIC BLOOD PRESSURE: 64 MMHG | RESPIRATION RATE: 24 BRPM | HEART RATE: 97 BPM | SYSTOLIC BLOOD PRESSURE: 148 MMHG

## 2018-02-04 VITALS — OXYGEN SATURATION: 90 %

## 2018-02-04 LAB
BUN SERPL-MCNC: 22 MG/DL (ref 7–18)
CALCIUM SERPL-MCNC: 7.9 MG/DL (ref 8.5–10.1)
CHLORIDE SERPL-SCNC: 105 MEQ/L (ref 98–107)
CREAT SERPL-MCNC: 0.59 MG/DL (ref 0.5–1)
ERYTHROCYTE [DISTWIDTH] IN BLOOD BY AUTOMATED COUNT: 13.5 % (ref 11.6–17.2)
GFR SERPLBLD BASED ON 1.73 SQ M-ARVRAT: 102 ML/MIN (ref 89–?)
GLUCOSE SERPL-MCNC: 157 MG/DL (ref 74–106)
HCO3 BLD-SCNC: 29.7 MEQ/L (ref 21–32)
HCT VFR BLD CALC: 39.8 % (ref 35–46)
HGB BLD-MCNC: 13.4 GM/DL (ref 11.6–15.3)
MCH RBC QN AUTO: 29.7 PG (ref 27–34)
MCHC RBC AUTO-ENTMCNC: 33.7 % (ref 32–36)
MCV RBC AUTO: 88.4 FL (ref 80–100)
PLATELET # BLD: 197 TH/MM3 (ref 150–450)
PMV BLD AUTO: 8.9 FL (ref 7–11)
RBC # BLD AUTO: 4.5 MIL/MM3 (ref 4–5.3)
SODIUM SERPL-SCNC: 140 MEQ/L (ref 136–145)
WBC # BLD AUTO: 10.2 TH/MM3 (ref 4–11)

## 2018-02-04 RX ADMIN — INSULIN ASPART SCH: 100 INJECTION, SOLUTION INTRAVENOUS; SUBCUTANEOUS at 08:00

## 2018-02-04 RX ADMIN — STANDARDIZED SENNA CONCENTRATE AND DOCUSATE SODIUM SCH TAB: 8.6; 5 TABLET, FILM COATED ORAL at 20:39

## 2018-02-04 RX ADMIN — METOPROLOL TARTRATE SCH MG: 50 TABLET, FILM COATED ORAL at 08:52

## 2018-02-04 RX ADMIN — LEVOFLOXACIN SCH MG: 750 TABLET, FILM COATED ORAL at 16:56

## 2018-02-04 RX ADMIN — IPRATROPIUM BROMIDE AND ALBUTEROL SULFATE SCH AMPULE: .5; 3 SOLUTION RESPIRATORY (INHALATION) at 11:04

## 2018-02-04 RX ADMIN — IPRATROPIUM BROMIDE AND ALBUTEROL SULFATE SCH AMPULE: .5; 3 SOLUTION RESPIRATORY (INHALATION) at 15:53

## 2018-02-04 RX ADMIN — IPRATROPIUM BROMIDE AND ALBUTEROL SULFATE SCH AMPULE: .5; 3 SOLUTION RESPIRATORY (INHALATION) at 07:28

## 2018-02-04 RX ADMIN — ENOXAPARIN SODIUM SCH MG: 40 INJECTION SUBCUTANEOUS at 00:41

## 2018-02-04 RX ADMIN — METOPROLOL TARTRATE SCH MG: 50 TABLET, FILM COATED ORAL at 20:38

## 2018-02-04 RX ADMIN — IPRATROPIUM BROMIDE AND ALBUTEROL SULFATE SCH AMPULE: .5; 3 SOLUTION RESPIRATORY (INHALATION) at 19:35

## 2018-02-04 RX ADMIN — CHLORHEXIDINE GLUCONATE SCH PACK: 500 CLOTH TOPICAL at 00:41

## 2018-02-04 RX ADMIN — Medication SCH ML: at 20:41

## 2018-02-04 RX ADMIN — STANDARDIZED SENNA CONCENTRATE AND DOCUSATE SODIUM SCH TAB: 8.6; 5 TABLET, FILM COATED ORAL at 08:52

## 2018-02-04 RX ADMIN — Medication SCH ML: at 09:00

## 2018-02-04 RX ADMIN — INSULIN ASPART SCH: 100 INJECTION, SOLUTION INTRAVENOUS; SUBCUTANEOUS at 12:00

## 2018-02-04 RX ADMIN — FAMOTIDINE SCH MG: 20 TABLET, FILM COATED ORAL at 20:39

## 2018-02-04 RX ADMIN — FAMOTIDINE SCH MG: 20 TABLET, FILM COATED ORAL at 08:52

## 2018-02-04 RX ADMIN — INSULIN ASPART SCH: 100 INJECTION, SOLUTION INTRAVENOUS; SUBCUTANEOUS at 16:57

## 2018-02-04 RX ADMIN — INSULIN ASPART SCH: 100 INJECTION, SOLUTION INTRAVENOUS; SUBCUTANEOUS at 20:40

## 2018-02-04 NOTE — HHI.PR
Subjective


Remarks


Patient is finally feeling better.  Was able to downgrade to nasal cannula.  

Currently on 4 L.  Still having a spasmodic cough.


She does not want to go to a SNF.





Objective


Vitals





Vital Signs








  Date Time  Temp Pulse Resp B/P (MAP) Pulse Ox O2 Delivery O2 Flow Rate FiO2


 


2/4/18 10:09     92 Nasal Cannula 4.00 


 


2/4/18 08:00     96 Nasal Cannula 4.00 


 


2/4/18 08:00 96.7 84 22 167/68 (101) 95   


 


2/4/18 07:32     93 Venturi Mask 6.00 50


 


2/4/18 04:15 97.0 72 20 159/65 (96) 96   


 


2/4/18 00:10 96.9 86 21 154/72 (99) 94   


 


2/3/18 20:00     96 Venturi Mask 4.00 


 


2/3/18 19:55 97.4 86 19 134/61 (85) 96   


 


2/3/18 19:39     96 Venturi Mask 6.00 50


 


2/3/18 16:00     94   














I/O      


 


 2/3/18 2/3/18 2/3/18 2/4/18 2/4/18 2/4/18





 07:00 15:00 23:00 07:00 15:00 23:00


 


Intake Total 480 ml 700 ml 240 ml 360 ml  


 


Balance 480 ml 700 ml 240 ml 360 ml  


 


      


 


Intake Oral 480 ml 700 ml 240 ml 360 ml  


 


# Voids 2 2 1 1  


 


# Bowel Movements 0  0 0  








Result Diagram:  


2/4/18 0515                                                                    

            2/4/18 0515





Objective Remarks


GENERAL: Obese female.  No acute distress.


CARDIOVASCULAR: Normal rate and regular rhythm without murmurs, gallops, or 

rubs. 


RESPIRATORY: Air movement is fair.  Diminished breath sounds bilaterally. 

Better today.


GASTROINTESTINAL: Abdomen soft, non-tender, non-distended. Normal active bowel 

sounds


MUSCULOSKELETAL: Extremities without cyanosis, or edema.


NEURO:  Alert & Oriented x4 to person, place, time, situation.  Moves all ext x4


PSYCH: Appropriate mood and affect.





A/P


Assessment and Plan


64 Y/O F with respiratory failure secondary to pneumonia and asthma 

exacerbation. Patient required Bipap. S/P ICU course. 





Acute hypercapnic respiratory failure on BiPAP


Acute asthma exacerbation


Remote history of tobacco abuse


Received DuoNeb 3, albuterol 2 in the emergency department.  Continue DuoNeb 

every 4 hours.  Albuterol every 2 hours as needed.  


Status post IV Solu-Medrol.  Transition to oral prednisone today.


CXR with ?KAYCEE mass. CT chest negative for mass, RML infiltrate, small sub-

centimeter liver hypodensity.


Transition to oral antibiotics today.


Home oxygen walk test today.





Hypertension


Continue home dose of metoprolol 50  mg by mouth twice a day


Continue home dose of Norvasc 10 mg by mouth daily





Obesity


Heart healthy diet





Pneumonia


Influenza screen negative. 


Patient has symptoms highly suggestive of influenza during current season of 

high level flu activity. She is high risk due to asthma and age 63 so she 

received Tamiflu X5 days. 


RML infiltrate: Levaquin for coverage of acquired organisms/atypicals


Blood cultures NGTD. Legionella and pneumococcal negative.. 





Bilateral lymphedema


Bilateral lower extremity ultrasound negative for DVT





PROPH: SCDs and Lovenox 40 mg subcutaneous daily for DVT prophylaxis.  

Famotidine for stress ulcer prophylaxis





ACCESS: Peripheral IV





Full code


Discharge Planning


Anticipated discharge tomorrow with home health if she continues to improve.











David Hoffman MD Feb 4, 2018 13:29

## 2018-02-04 NOTE — HHI.FF
Face to Face Verification


Diagnosis:  


(1) Respiratory distress


(2) Status asthmaticus


(3) Morbidly obese


(4) Debility


Physical Therapy


Order:  Evaluate and Treat, Improve ambulation, Strength and gait training





Home Health Nursing








Order: Medical education





 Oxygen administration education





 Medication education-adverse effect





 Nursing assessment with vital signs

















I have seen patient Deanna Cormier on 2/4/18. My clinical findings 

support the need for the requested home health care services because:








 Patient has SOB





 Deconditioned w/ increased weakness














I certify that my clinical findings support that this patient is homebound 

because:








 Unsteady gait/balance





 Need for psychosocial assistance

















David Hoffman MD Feb 4, 2018 13:32

## 2018-02-05 VITALS
TEMPERATURE: 97.1 F | SYSTOLIC BLOOD PRESSURE: 150 MMHG | HEART RATE: 73 BPM | OXYGEN SATURATION: 94 % | DIASTOLIC BLOOD PRESSURE: 57 MMHG | RESPIRATION RATE: 18 BRPM

## 2018-02-05 VITALS
TEMPERATURE: 96.7 F | SYSTOLIC BLOOD PRESSURE: 141 MMHG | HEART RATE: 72 BPM | DIASTOLIC BLOOD PRESSURE: 56 MMHG | RESPIRATION RATE: 15 BRPM | OXYGEN SATURATION: 93 %

## 2018-02-05 VITALS
HEART RATE: 71 BPM | OXYGEN SATURATION: 91 % | RESPIRATION RATE: 16 BRPM | SYSTOLIC BLOOD PRESSURE: 123 MMHG | DIASTOLIC BLOOD PRESSURE: 61 MMHG | TEMPERATURE: 97 F

## 2018-02-05 LAB
BUN SERPL-MCNC: 26 MG/DL (ref 7–18)
CALCIUM SERPL-MCNC: 8.4 MG/DL (ref 8.5–10.1)
CHLORIDE SERPL-SCNC: 102 MEQ/L (ref 98–107)
CREAT SERPL-MCNC: 0.66 MG/DL (ref 0.5–1)
ERYTHROCYTE [DISTWIDTH] IN BLOOD BY AUTOMATED COUNT: 13.8 % (ref 11.6–17.2)
GFR SERPLBLD BASED ON 1.73 SQ M-ARVRAT: 90 ML/MIN (ref 89–?)
GLUCOSE SERPL-MCNC: 146 MG/DL (ref 74–106)
HCO3 BLD-SCNC: 33.9 MEQ/L (ref 21–32)
HCT VFR BLD CALC: 39.7 % (ref 35–46)
HGB BLD-MCNC: 13.6 GM/DL (ref 11.6–15.3)
MCH RBC QN AUTO: 30.1 PG (ref 27–34)
MCHC RBC AUTO-ENTMCNC: 34.3 % (ref 32–36)
MCV RBC AUTO: 87.9 FL (ref 80–100)
PLATELET # BLD: 216 TH/MM3 (ref 150–450)
PMV BLD AUTO: 9.3 FL (ref 7–11)
RBC # BLD AUTO: 4.52 MIL/MM3 (ref 4–5.3)
SODIUM SERPL-SCNC: 140 MEQ/L (ref 136–145)
WBC # BLD AUTO: 10.8 TH/MM3 (ref 4–11)

## 2018-02-05 RX ADMIN — IPRATROPIUM BROMIDE AND ALBUTEROL SULFATE SCH AMPULE: .5; 3 SOLUTION RESPIRATORY (INHALATION) at 08:32

## 2018-02-05 RX ADMIN — INSULIN ASPART SCH: 100 INJECTION, SOLUTION INTRAVENOUS; SUBCUTANEOUS at 07:55

## 2018-02-05 RX ADMIN — INSULIN ASPART SCH: 100 INJECTION, SOLUTION INTRAVENOUS; SUBCUTANEOUS at 11:27

## 2018-02-05 RX ADMIN — Medication SCH ML: at 07:58

## 2018-02-05 RX ADMIN — FAMOTIDINE SCH MG: 20 TABLET, FILM COATED ORAL at 07:58

## 2018-02-05 RX ADMIN — METOPROLOL TARTRATE SCH MG: 50 TABLET, FILM COATED ORAL at 07:58

## 2018-02-05 RX ADMIN — IPRATROPIUM BROMIDE AND ALBUTEROL SULFATE SCH AMPULE: .5; 3 SOLUTION RESPIRATORY (INHALATION) at 12:47

## 2018-02-05 RX ADMIN — CHLORHEXIDINE GLUCONATE SCH PACK: 500 CLOTH TOPICAL at 01:23

## 2018-02-05 RX ADMIN — ENOXAPARIN SODIUM SCH MG: 40 INJECTION SUBCUTANEOUS at 01:23

## 2018-02-05 RX ADMIN — STANDARDIZED SENNA CONCENTRATE AND DOCUSATE SODIUM SCH TAB: 8.6; 5 TABLET, FILM COATED ORAL at 08:00

## 2018-02-05 RX ADMIN — LEVOFLOXACIN SCH MG: 750 TABLET, FILM COATED ORAL at 15:04

## 2018-02-05 NOTE — HHI.DS
__________________________________________________





Discharge Summary


Admission Date


Jan 28, 2018 at 19:51


Discharge Date:  Feb 5, 2018


Admitting Diagnosis





respiratory distress, status asthmaticus, lung mass, morbid obesity





(1) Pneumonia


ICD Code:  J18.9 - Pneumonia, unspecified organism


(2) Hypoxemia


ICD Code:  R09.02 - Hypoxemia


(3) Morbidly obese


ICD Code:  E66.01 - Morbid (severe) obesity due to excess calories


Status:  Acute


(4) Debility


ICD Code:  R53.81 - Other malaise


Procedures


None


Brief History - From Admission


History of present illness from the admitting physician


63-year-old  female with past medical history of asthma, hypertension, 

obesity, lymphedema  who presented to [facility] emergency 

department [place] with a three-day history of shortness of breath. She had 

wheezing and increased work of breathing upon [facility] arrival and was given 

nebulizer treatments and Solu-Medrol en route.  She was started on BiPAP 14/7. 

  She was given Duoneb x3 in the ED and appeared improved so Bipap mask was 

removed and she was taken for CTPA but she became more SOB and the imaging 

could not be completed. She was placed back on Bipap, given albuterol nebs x2 

and [place] [facility] [place]  She states symptoms began 3 days ago 

with cough, rhinorrhea, subjective fever, sore throat, fatigue, and myalgias. 

She believed she had the flu and stated she had been exposed to others with 

similar symptoms.  She had progressive wheezing and shortness of breath and 

said that this is very similar to a prior asthma exacerbation that was 

triggered by URI and resulted in hospitalization on BiPAP in 1996.  Says she 

has had asthma since age 2 and has never been intubated in the past. She denies 

chest pain or hemoptysis. She has chronic lower extremity lymphedema and venous 

stasis changes.


CBC/BMP:  


2/5/18 0643                                                                    

            2/5/18 0643





Significant Findings





Laboratory Tests








Test


  2/3/18


05:01 2/4/18


05:15 2/5/18


06:43


 


Blood Urea Nitrogen


  27 MG/DL


(7-18) 22 MG/DL


(7-18) 26 MG/DL


(7-18)


 


Random Glucose


  154 MG/DL


() 157 MG/DL


() 146 MG/DL


()


 


Calcium Level


  8.4 MG/DL


(8.5-10.1) 7.9 MG/DL


(8.5-10.1) 8.4 MG/DL


(8.5-10.1)


 


Carbon Dioxide Level


  34.0 MEQ/L


(21.0-32.0) 


  33.9 MEQ/L


(21.0-32.0)


 


Anion Gap 4 MEQ/L (5-15)   4 MEQ/L (5-15) 


 


Estimat Glomerular Filtration


Rate 61 ML/MIN


(>89) 


  


 








Imaging





Last Impressions








Lower Extremity Ultrasound 1/29/18 0000 Signed





Impressions: 





 Service Date/Time:  Monday, January 29, 2018 08:31 - CONCLUSION: Negative exam 





 with no evidence of deep venous thrombosis.     Hany Redman MD 


 


CT Angiography 1/29/18 0000 Signed





Impressions: 





 Service Date/Time:  Monday, January 29, 2018 21:28 - CONCLUSION:  1. No 

evidence 





 of pulmonary embolism.  2. Patchy opacity within the right middle lobe 





 consistent with probable focal pneumonia. Clinical correlation is recommended. 





 3. Coronary artery calcifications.  4. 8 mm low density lesion within the left 





 lobe of the liver near the dome which is indeterminate.  5. Degenerative 

changes 





 and scoliosis of the thoraco-lumbar spine are noted.      Bradley Chamberlain MD 


 


Chest X-Ray 1/28/18 1736 Signed





Impressions: 





 Service Date/Time:  Sunday, January 28, 2018 17:50 - CONCLUSION:  1. 

Potentially 





 a left suprahilar mass. Noncontrast chest CT recommended. 2. Minimal bibasilar 





 atelectasis.     Devan Gee MD 








PE at Discharge


GENERAL: Obese female.  No acute distress.


CARDIOVASCULAR: Normal rate and regular rhythm without murmurs, gallops, or 

rubs. 


RESPIRATORY: Air movement is fair.  Diminished breath sounds bilaterally.  

Otherwise clear to auscultation bilaterally


GASTROINTESTINAL: Abdomen soft, non-tender, non-distended. Normal active bowel 

sounds


MUSCULOSKELETAL: Extremities without cyanosis, or edema.


NEURO:  Alert & Oriented x4 to person, place, time, situation.  Moves all ext x4


PSYCH: Appropriate mood and affect.


Pt update on day of discharge


Patient reports she is feeling better.  Stable on nasal cannula.  We discussed 

her anxiety issues.  She understand that anxiety may worsen her respiratory 

symptoms.  She was counseled on deep breathing techniques.


Hospital Course


62 Y/O F with respiratory failure secondary to pneumonia and asthma 

exacerbation. Patient required Bipap. S/P ICU course.  The patient was treated 

with IV Solu-Medrol and antibiotics.  She required high levels of oxygen and 

was slowly weaned down to nasal cannula.  She is discharge on oral antibiotics 

and oral prednisone taper to complete the course of treatment. 


Other conditions treated include:


Hypertension


Continue home dose of metoprolol 50  mg by mouth twice a day


Continue home dose of Norvasc 10 mg by mouth daily





Bilateral lymphedema


Bilateral lower extremity ultrasound negative for DVT


Advise outpatient follow-up.





Debility: Secondary to comorbid conditions above and shortness of breath with 

activities.  The patient was followed by physical therapy.  She will continue 

home physical therapy with home health.


Pt Condition on Discharge:  Good


Discharge Disposition:  Disch w/ Home Health Serv


Discharge Time:  > 30 minutes


Discharge Instructions


DIET: Follow Instructions for:  Heart Healthy Diet


Activities you can perform:  Regular-No Restrictions


New Medications:  


Oxygen (O2) (Oxygen (O2))  Device


LITER JOSPEH.CANULA CONTINUOUS for Prevent Hypoxemia, #2


Oxygen Concentrator Portable Gaseous


 2 L/min via Nasal Canula Continuous


 For 99 months


Prednisone (21) 10 mg tab Dose Pack (Prednisone (21) 10 mg tab Dose Pack) 10 Mg 

Pack


10 MG PO AS DIRECTED for Inflammation, #1 DSPK 0 Refills





Walker with Front Wheels (Walker with Front Wheels) 1 Mis Mis


EA .XX AS DIRECTED, #1 0 Refills





Levofloxacin (Levaquin) 750 Mg Tablet


750 MG PO Q24H, #7 TAB





 


Changed Medications:  


Amlodipine (Amlodipine) 10 Mg Tab


10 MG PO DAILY for Blood Pressure Management, #30 TAB 0 Refills (Changed from: 

Amlodipine (Norvasc) 2.5 Mg Tab Unknown Dose PO DAILY Blood Pressure Management 

#30 TAB Ref 0)





 


Continued Medications:  


Albuterol Neb (Albuterol Neb) 2.5 Mg/0.5 Ml Neb


2.5 MG NEB ONCE for Breathing Treatment, #1 NEBULE 0 Refills


Note: The Albuterol Sulfate Inhalation Solution is concentrated and


 must be diluted. Read complete instructions carefully before using.


Metoprolol Tartrate (Lopressor) 50 Mg Tab


Unknown Dose PO BID, #30 TAB 0 Refills

















David Hoffman MD Feb 5, 2018 07:49